# Patient Record
Sex: FEMALE | Race: WHITE | NOT HISPANIC OR LATINO | Employment: OTHER | URBAN - NONMETROPOLITAN AREA
[De-identification: names, ages, dates, MRNs, and addresses within clinical notes are randomized per-mention and may not be internally consistent; named-entity substitution may affect disease eponyms.]

---

## 2023-04-19 NOTE — PROGRESS NOTES
Subjective   Patient ID:   Adriana Marcum is a 72 y.o. female who presents for Establish Care.  HPI  New patient here today to establish care with myself.  Recently moved back to [place] from [place].    COPD:  Taking albuterol and Trelegy.  Breathing is stable.  Needs to see pulmonology.    HTN:  Taking Amlodipine, hydrochlorothiazide, Lisinopril.  BP today is 137/79.  Denies dizziness, headaches.    HLD:  Taking Atorvastatin.  Last checked over a year ago.  DUE for labs.    GERD:  Taking Lansoprazole.    Depression:  Taking Zoloft.  Denies SI/HI.    Health maintenance:  Smoking: Former smoker.  Mammogram (40-75): DUE  Labs: DUE  Colonoscopy (50-75): DUE  DEXA (65+, q 2 years):   Prevnar 13 (65+):  Pneumovax 23 (65+, 1 year after Prev 13):  Influenza:  Zostavax (60+, 1 time, at pharmacy):    Review of Systems  12 point review of systems negative unless stated above in HPI    Vitals:    04/27/23 0919   BP: 137/79   Pulse: 74   Resp: 18   SpO2: 96%       Physical Exam  General: Alert and oriented, well nourished, no acute distress.  Lungs: Clear to auscultation, non-labored respiration.  Heart: Normal rate, regular rhythm, no murmur, gallop or edema.  Neurologic: Awake, alert, and oriented X3, CN II-XII intact.  Psychiatric: Cooperative, appropriate mood and affect.    Assessment/Plan   It was nice meeting you!  I have placed a referral to pulmonology for further management.  I have ordered some labs to be done as soon as you can.  We will call you with the results.  I have ordered you a mammogram to be done as soon as you are able.  We will call the results.  Continue the same medications.  Chronic conditions are stable.  Call with questions or concerns.    F/u  6 months  Diagnoses and all orders for this visit:  Body mass index (BMI) of 36.0-36.9 in adult  Chronic obstructive pulmonary disease, unspecified COPD type (CMS/HCC)  -     albuterol 2.5 mg /3 mL (0.083 %) nebulizer solution; Take 3 mL (2.5 mg) by  nebulization every 4 hours if needed for wheezing.  -     fluticasone-umeclidin-vilanter (TRELEGY-ELLIPTA) 100-62.5-25 mcg blister with device; Inhale 1 puff once daily.  -     Referral to Pulmonology; Future  Hypertension, unspecified type  -     amLODIPine (Norvasc) 5 mg tablet; Take 1 tablet (5 mg) by mouth once daily.  -     hydroCHLOROthiazide (HYDRODiuril) 25 mg tablet; Take 1 tablet (25 mg) by mouth once daily.  -     lisinopril 40 mg tablet; Take 2 tablets (80 mg) by mouth once daily.  Hypercholesterolemia  -     atorvastatin (Lipitor) 80 mg tablet; Take 1 tablet (80 mg) by mouth once daily.  Gastroesophageal reflux disease without esophagitis  -     lansoprazole (Prevacid) 15 mg DR capsule; Take 1 capsule (15 mg) by mouth once daily.  Depression, unspecified depression type  -     sertraline (Zoloft) 100 mg tablet; Take 1 tablet (100 mg) by mouth once daily.  Visit for screening mammogram  -     BI mammo bilateral screening tomosynthesis; Future

## 2023-04-27 ENCOUNTER — OFFICE VISIT (OUTPATIENT)
Dept: PRIMARY CARE | Facility: CLINIC | Age: 73
End: 2023-04-27
Payer: COMMERCIAL

## 2023-04-27 VITALS
WEIGHT: 226.4 LBS | SYSTOLIC BLOOD PRESSURE: 137 MMHG | RESPIRATION RATE: 18 BRPM | HEART RATE: 74 BPM | OXYGEN SATURATION: 96 % | DIASTOLIC BLOOD PRESSURE: 79 MMHG | HEIGHT: 66 IN | BODY MASS INDEX: 36.38 KG/M2

## 2023-04-27 DIAGNOSIS — Z12.31 VISIT FOR SCREENING MAMMOGRAM: ICD-10-CM

## 2023-04-27 DIAGNOSIS — K21.9 GASTROESOPHAGEAL REFLUX DISEASE WITHOUT ESOPHAGITIS: ICD-10-CM

## 2023-04-27 DIAGNOSIS — F32.A DEPRESSION, UNSPECIFIED DEPRESSION TYPE: ICD-10-CM

## 2023-04-27 DIAGNOSIS — I10 HYPERTENSION, UNSPECIFIED TYPE: ICD-10-CM

## 2023-04-27 DIAGNOSIS — J44.9 CHRONIC OBSTRUCTIVE PULMONARY DISEASE, UNSPECIFIED COPD TYPE (MULTI): ICD-10-CM

## 2023-04-27 DIAGNOSIS — E78.00 HYPERCHOLESTEROLEMIA: ICD-10-CM

## 2023-04-27 PROCEDURE — 3075F SYST BP GE 130 - 139MM HG: CPT | Performed by: PHYSICIAN ASSISTANT

## 2023-04-27 PROCEDURE — 1036F TOBACCO NON-USER: CPT | Performed by: PHYSICIAN ASSISTANT

## 2023-04-27 PROCEDURE — 1159F MED LIST DOCD IN RCRD: CPT | Performed by: PHYSICIAN ASSISTANT

## 2023-04-27 PROCEDURE — 99203 OFFICE O/P NEW LOW 30 MIN: CPT | Performed by: PHYSICIAN ASSISTANT

## 2023-04-27 PROCEDURE — 3008F BODY MASS INDEX DOCD: CPT | Performed by: PHYSICIAN ASSISTANT

## 2023-04-27 PROCEDURE — 1160F RVW MEDS BY RX/DR IN RCRD: CPT | Performed by: PHYSICIAN ASSISTANT

## 2023-04-27 PROCEDURE — 3078F DIAST BP <80 MM HG: CPT | Performed by: PHYSICIAN ASSISTANT

## 2023-04-27 RX ORDER — AMLODIPINE BESYLATE 5 MG/1
5 TABLET ORAL DAILY
Qty: 90 TABLET | Refills: 1 | Status: SHIPPED | OUTPATIENT
Start: 2023-04-27 | End: 2023-06-06 | Stop reason: ALTCHOICE

## 2023-04-27 RX ORDER — ALBUTEROL SULFATE 0.83 MG/ML
2.5 SOLUTION RESPIRATORY (INHALATION) EVERY 4 HOURS PRN
COMMUNITY
Start: 2016-11-21 | End: 2023-04-27 | Stop reason: SDUPTHER

## 2023-04-27 RX ORDER — LISINOPRIL 40 MG/1
80 TABLET ORAL DAILY
COMMUNITY
Start: 2023-04-05 | End: 2023-04-27 | Stop reason: SDUPTHER

## 2023-04-27 RX ORDER — CALC/MAG/B COMPLEX/D3/HERB 61
15 TABLET ORAL
COMMUNITY
Start: 2016-12-23 | End: 2023-04-27 | Stop reason: SDUPTHER

## 2023-04-27 RX ORDER — HYDROCHLOROTHIAZIDE 25 MG/1
25 TABLET ORAL
COMMUNITY
Start: 2016-11-08 | End: 2023-04-27 | Stop reason: SDUPTHER

## 2023-04-27 RX ORDER — CALC/MAG/B COMPLEX/D3/HERB 61
15 TABLET ORAL
Qty: 90 CAPSULE | Refills: 1 | Status: SHIPPED | OUTPATIENT
Start: 2023-04-27 | End: 2023-09-26

## 2023-04-27 RX ORDER — SERTRALINE HYDROCHLORIDE 100 MG/1
100 TABLET, FILM COATED ORAL
COMMUNITY
End: 2023-04-27 | Stop reason: SDUPTHER

## 2023-04-27 RX ORDER — HYDROCHLOROTHIAZIDE 25 MG/1
25 TABLET ORAL
Qty: 90 TABLET | Refills: 1 | Status: SHIPPED | OUTPATIENT
Start: 2023-04-27 | End: 2023-06-06 | Stop reason: ALTCHOICE

## 2023-04-27 RX ORDER — AMLODIPINE BESYLATE 5 MG/1
5 TABLET ORAL DAILY
COMMUNITY
Start: 2023-03-18 | End: 2023-04-27 | Stop reason: SDUPTHER

## 2023-04-27 RX ORDER — ASPIRIN 81 MG/1
81 TABLET ORAL
COMMUNITY

## 2023-04-27 RX ORDER — SERTRALINE HYDROCHLORIDE 100 MG/1
100 TABLET, FILM COATED ORAL
Qty: 90 TABLET | Refills: 1 | Status: SHIPPED | OUTPATIENT
Start: 2023-04-27 | End: 2023-11-08

## 2023-04-27 RX ORDER — ALBUTEROL SULFATE 0.83 MG/ML
2.5 SOLUTION RESPIRATORY (INHALATION) EVERY 4 HOURS PRN
Qty: 75 ML | Refills: 2 | Status: SHIPPED | OUTPATIENT
Start: 2023-04-27 | End: 2023-11-02 | Stop reason: ALTCHOICE

## 2023-04-27 RX ORDER — ATORVASTATIN CALCIUM 80 MG/1
80 TABLET, FILM COATED ORAL
COMMUNITY
End: 2023-04-27 | Stop reason: SDUPTHER

## 2023-04-27 RX ORDER — LISINOPRIL 40 MG/1
80 TABLET ORAL DAILY
Qty: 90 TABLET | Refills: 1 | Status: SHIPPED | OUTPATIENT
Start: 2023-04-27 | End: 2023-06-06 | Stop reason: ALTCHOICE

## 2023-04-27 RX ORDER — ATORVASTATIN CALCIUM 80 MG/1
80 TABLET, FILM COATED ORAL
Qty: 90 TABLET | Refills: 1 | Status: SHIPPED | OUTPATIENT
Start: 2023-04-27 | End: 2023-11-08

## 2023-04-27 ASSESSMENT — PATIENT HEALTH QUESTIONNAIRE - PHQ9
2. FEELING DOWN, DEPRESSED OR HOPELESS: SEVERAL DAYS
SUM OF ALL RESPONSES TO PHQ9 QUESTIONS 1 AND 2: 1
1. LITTLE INTEREST OR PLEASURE IN DOING THINGS: NOT AT ALL
10. IF YOU CHECKED OFF ANY PROBLEMS, HOW DIFFICULT HAVE THESE PROBLEMS MADE IT FOR YOU TO DO YOUR WORK, TAKE CARE OF THINGS AT HOME, OR GET ALONG WITH OTHER PEOPLE: SOMEWHAT DIFFICULT

## 2023-04-27 ASSESSMENT — PAIN SCALES - GENERAL: PAINLEVEL: 0-NO PAIN

## 2023-05-30 ENCOUNTER — PATIENT OUTREACH (OUTPATIENT)
Dept: PRIMARY CARE | Facility: CLINIC | Age: 73
End: 2023-05-30

## 2023-05-30 NOTE — PROGRESS NOTES
Discharge Facility: [facility]  Discharge Diagnosis: CABG  Admission Date: 17 May 23  Discharge Date: 28 May 23    PCP Appointment Date: 6 June 23  Specialist Appointment Date: 9 June 23 (cardiology)  Hospital Encounter and Summary: Linked    No contact on outreach. Pt has appt set for follow up

## 2023-06-06 ENCOUNTER — OFFICE VISIT (OUTPATIENT)
Dept: PRIMARY CARE | Facility: CLINIC | Age: 73
End: 2023-06-06
Payer: COMMERCIAL

## 2023-06-06 VITALS
BODY MASS INDEX: 33.65 KG/M2 | OXYGEN SATURATION: 93 % | HEIGHT: 66 IN | HEART RATE: 58 BPM | RESPIRATION RATE: 18 BRPM | SYSTOLIC BLOOD PRESSURE: 131 MMHG | DIASTOLIC BLOOD PRESSURE: 58 MMHG | WEIGHT: 209.4 LBS

## 2023-06-06 DIAGNOSIS — Z09 HOSPITAL DISCHARGE FOLLOW-UP: ICD-10-CM

## 2023-06-06 DIAGNOSIS — K21.9 GASTROESOPHAGEAL REFLUX DISEASE WITHOUT ESOPHAGITIS: ICD-10-CM

## 2023-06-06 DIAGNOSIS — F32.A DEPRESSION, UNSPECIFIED DEPRESSION TYPE: ICD-10-CM

## 2023-06-06 DIAGNOSIS — E78.00 HYPERCHOLESTEROLEMIA: ICD-10-CM

## 2023-06-06 DIAGNOSIS — I10 HYPERTENSION, UNSPECIFIED TYPE: ICD-10-CM

## 2023-06-06 DIAGNOSIS — J44.9 CHRONIC OBSTRUCTIVE PULMONARY DISEASE, UNSPECIFIED COPD TYPE (MULTI): ICD-10-CM

## 2023-06-06 DIAGNOSIS — Z95.1 HISTORY OF CORONARY ARTERY BYPASS GRAFT X 1: ICD-10-CM

## 2023-06-06 PROCEDURE — 3075F SYST BP GE 130 - 139MM HG: CPT | Performed by: INTERNAL MEDICINE

## 2023-06-06 PROCEDURE — 1160F RVW MEDS BY RX/DR IN RCRD: CPT | Performed by: INTERNAL MEDICINE

## 2023-06-06 PROCEDURE — 99496 TRANSJ CARE MGMT HIGH F2F 7D: CPT | Performed by: INTERNAL MEDICINE

## 2023-06-06 PROCEDURE — 1159F MED LIST DOCD IN RCRD: CPT | Performed by: INTERNAL MEDICINE

## 2023-06-06 PROCEDURE — 3078F DIAST BP <80 MM HG: CPT | Performed by: INTERNAL MEDICINE

## 2023-06-06 PROCEDURE — 1036F TOBACCO NON-USER: CPT | Performed by: INTERNAL MEDICINE

## 2023-06-06 PROCEDURE — 3008F BODY MASS INDEX DOCD: CPT | Performed by: INTERNAL MEDICINE

## 2023-06-06 RX ORDER — MULTIVITAMIN
1 TABLET ORAL DAILY
COMMUNITY
Start: 2023-05-27 | End: 2023-11-02 | Stop reason: ALTCHOICE

## 2023-06-06 RX ORDER — TIOTROPIUM BROMIDE 18 UG/1
1 CAPSULE ORAL; RESPIRATORY (INHALATION)
Qty: 30 CAPSULE | Refills: 5 | Status: SHIPPED | OUTPATIENT
Start: 2023-06-06 | End: 2023-11-02 | Stop reason: ALTCHOICE

## 2023-06-06 RX ORDER — ACETAMINOPHEN 325 MG/1
650 TABLET ORAL EVERY 6 HOURS PRN
COMMUNITY
Start: 2023-05-27

## 2023-06-06 RX ORDER — CLOPIDOGREL BISULFATE 75 MG/1
1 TABLET ORAL DAILY
Qty: 29 TABLET | Refills: 0 | COMMUNITY
Start: 2023-05-28 | End: 2023-06-26

## 2023-06-06 RX ORDER — METOPROLOL SUCCINATE 25 MG/1
12.5 TABLET, EXTENDED RELEASE ORAL DAILY
Qty: 90 TABLET | Refills: 0 | Status: SHIPPED | OUTPATIENT
Start: 2023-06-06 | End: 2023-09-07 | Stop reason: SDUPTHER

## 2023-06-06 RX ORDER — IRON POLYSACCHARIDE COMPLEX 150 MG
CAPSULE ORAL
COMMUNITY
Start: 2023-05-28 | End: 2023-06-26

## 2023-06-06 RX ORDER — METOPROLOL TARTRATE 25 MG/1
TABLET, FILM COATED ORAL 2 TIMES DAILY
COMMUNITY
Start: 2023-05-28 | End: 2023-06-06 | Stop reason: ALTCHOICE

## 2023-06-06 RX ORDER — ACETAMINOPHEN 500 MG
TABLET ORAL
COMMUNITY
End: 2023-11-02 | Stop reason: ALTCHOICE

## 2023-06-06 ASSESSMENT — PAIN SCALES - GENERAL: PAINLEVEL: 2

## 2023-06-06 NOTE — PROGRESS NOTES
Patient ID:   Adriana Marcum is a 72 y.o. female with PMH remarkable for COPD, HTN, HLD, GERD, factor V, depression who presents to the office today for Hospital Follow-up.    Hospital Discharge Follow Up:  Date(s): 5/17 to 5/28/2023  Location: [facility] with SOB, hypoxia 2/2 CHF, COPD exacerbation and NSTEMI. Transferred to [facility] for LHC where she was found to have severe ostial LAD disease on cor angio and transferred to [facility] for CABG evaluation. On 5/24/2023 pt underwent left aaron-lateral mini thoracotomy, mid cab procedure - LIMA to LAD, rib re approximation with 0-ticron sutures and closure of subcutaneous/subcuticular tissue.   -- Echo 5/13/2023 showed LVSF 45-50%, mid and apical ant wall, mid and apical anterior septum and apex are abnormal. Pseudonormal pattern of LVDF, ICMO, moderate MVR, mild AVS.     Upcoming appt's:  Dr Melton 6/9  EKG and CxR 6/19/2023  Cardiac Surgery Dr Mueller 6/20    - pt was seen in the office by my colleague on 4/27/2023 to establish care within the practice.  -- will price out spiriva since trelegy is very expensive.     REVIEW OF SYSTEMS:  Review of Systems   All other systems reviewed and are negative.    12 point review of systems negative unless stated above in HPI    VITAL SIGNS:  Vitals:    06/06/23 1340   BP: 131/58   Pulse: 58   Resp: 18   SpO2: 93%       ALLERGIES:  Allergies   Allergen Reactions    Oxycodone-Acetaminophen Other     tachycardia    Celebrex [Celecoxib] Other     Stomach upset        PHYSICAL EXAM:  Physical Exam  Vitals reviewed.   Constitutional:       General: She is not in acute distress.     Appearance: Normal appearance. She is not ill-appearing.   HENT:      Head: Normocephalic and atraumatic.      Right Ear: Tympanic membrane and external ear normal.      Left Ear: Tympanic membrane and external ear normal.      Nose: Nose normal.      Mouth/Throat:      Mouth: Mucous membranes are moist.      Pharynx: Oropharynx is clear.   Eyes:       Conjunctiva/sclera: Conjunctivae normal.      Pupils: Pupils are equal, round, and reactive to light.   Cardiovascular:      Rate and Rhythm: Normal rate and regular rhythm.      Heart sounds: Normal heart sounds. No murmur heard.  Pulmonary:      Effort: Pulmonary effort is normal. No respiratory distress.      Breath sounds: Normal breath sounds. No wheezing.   Abdominal:      General: There is no distension.      Palpations: Abdomen is soft. There is no mass.      Tenderness: There is no abdominal tenderness.   Musculoskeletal:         General: Normal range of motion.      Cervical back: Normal range of motion and neck supple.   Skin:     General: Skin is warm and dry.   Neurological:      General: No focal deficit present.      Mental Status: She is alert and oriented to person, place, and time.      Sensory: No sensory deficit.      Motor: No weakness.      Coordination: Coordination normal.      Gait: Gait normal.   Psychiatric:         Mood and Affect: Mood normal.         Behavior: Behavior normal.         MEDICATIONS:  Current Outpatient Medications on File Prior to Visit   Medication Sig Dispense Refill    acetaminophen (Tylenol) 325 mg tablet Take 2 tablets (650 mg) by mouth every 6 hours if needed.      albuterol 2.5 mg /3 mL (0.083 %) nebulizer solution Take 3 mL (2.5 mg) by nebulization every 4 hours if needed for wheezing. 75 mL 2    aspirin 81 mg EC tablet Take 1 tablet (81 mg) by mouth once daily.      atorvastatin (Lipitor) 80 mg tablet Take 1 tablet (80 mg) by mouth once daily. 90 tablet 1    clopidogrel (Plavix) 75 mg tablet Take 1 tablet (75 mg) by mouth once daily. 29 tablet 0    fluticasone-umeclidin-vilanter (TRELEGY-ELLIPTA) 100-62.5-25 mcg blister with device Inhale 1 puff once daily. 3 each 1    iron polysaccharides (Nu-Iron,Niferex) 150 mg iron capsule Take by mouth.      lansoprazole (Prevacid) 15 mg DR capsule Take 1 capsule (15 mg) by mouth once daily. 90 capsule 1    melatonin 5 mg  tablet Take by mouth.      multivitamin tablet Take 1 tablet by mouth once daily.      sertraline (Zoloft) 100 mg tablet Take 1 tablet (100 mg) by mouth once daily. 90 tablet 1    [DISCONTINUED] metoprolol tartrate (Lopressor) 25 mg tablet Take by mouth twice a day. TAKE ONE TABLET TWICE DAILY; TO HOLD IF HEART RATE 60 OR LESS      [DISCONTINUED] amLODIPine (Norvasc) 5 mg tablet Take 1 tablet (5 mg) by mouth once daily. 90 tablet 1    [DISCONTINUED] hydroCHLOROthiazide (HYDRODiuril) 25 mg tablet Take 1 tablet (25 mg) by mouth once daily. 90 tablet 1    [DISCONTINUED] lisinopril 40 mg tablet Take 2 tablets (80 mg) by mouth once daily. 90 tablet 1     No current facility-administered medications on file prior to visit.       LABORATORY DATA:  Lab Results   Component Value Date    WBC 11.8 (H) 05/28/2023    HGB 10.8 (L) 05/28/2023    HCT 32.7 (L) 05/28/2023     05/28/2023    CHOL 168 05/13/2023    TRIG 95 05/13/2023    HDL 59.1 05/13/2023    ALT 26 05/18/2023    AST 17 05/18/2023     (L) 05/28/2023    K 4.0 05/28/2023    CL 94 (L) 05/28/2023    CREATININE 0.84 05/28/2023    BUN 18 05/28/2023    CO2 25 05/28/2023    TSH 4.07 (H) 05/18/2023    INR 1.1 05/24/2023    HGBA1C CANCELED 05/18/2023     ASSESSMENT AND PLAN:  Assessment/Plan   Diagnoses and all orders for this visit:  Hospital discharge follow-up  Chronic obstructive pulmonary disease, unspecified COPD type (CMS/[facility])  -     tiotropium (Spiriva) 18 mcg inhalation capsule; Place 1 capsule (18 mcg) into inhaler and inhale once daily.  Hypertension, unspecified type  Depression, unspecified depression type  Gastroesophageal reflux disease without esophagitis  Hypercholesterolemia  History of coronary artery bypass graft x 1  -     metoprolol succinate XL (Toprol-XL) 25 mg 24 hr tablet; Take 0.5 tablets (12.5 mg) by mouth once daily. Do not crush or chew. Hold if dizzy, lightheaded      ------  Written by Zonia Ortiz RN, acting as a scribe for   Cesar. This note accurately reflects the work and decisions made by Dr. Guerrero.     I, Dr. Guerrero, attest all medical record entries made by the scribe were under my direction and were personally dictated by me. I have reviewed the chart and agree that the record accurately reflects my performance of the history, physical exam, and assessment and plan.

## 2023-06-07 ENCOUNTER — PATIENT OUTREACH (OUTPATIENT)
Dept: PRIMARY CARE | Facility: CLINIC | Age: 73
End: 2023-06-07

## 2023-06-07 NOTE — PROGRESS NOTES
Call regarding appt. with PCP on (6 June 23) after hospitalization.  At time of outreach call the patient feels as if their condition has improved since last visit.  Reviewed the PCP appointment with the pt and addressed any questions or concerns.

## 2023-06-14 DIAGNOSIS — J44.9 CHRONIC OBSTRUCTIVE PULMONARY DISEASE, UNSPECIFIED COPD TYPE (MULTI): Primary | ICD-10-CM

## 2023-06-14 LAB
ANION GAP IN SER/PLAS: 15 MMOL/L (ref 10–20)
BASOPHILS (10*3/UL) IN BLOOD BY AUTOMATED COUNT: 0.05 X10E9/L (ref 0–0.1)
BASOPHILS/100 LEUKOCYTES IN BLOOD BY AUTOMATED COUNT: 0.7 % (ref 0–2)
CALCIUM (MG/DL) IN SER/PLAS: 9.2 MG/DL (ref 8.6–10.3)
CARBON DIOXIDE, TOTAL (MMOL/L) IN SER/PLAS: 25 MMOL/L (ref 21–32)
CHLORIDE (MMOL/L) IN SER/PLAS: 100 MMOL/L (ref 98–107)
CREATININE (MG/DL) IN SER/PLAS: 0.68 MG/DL (ref 0.5–1.05)
EOSINOPHILS (10*3/UL) IN BLOOD BY AUTOMATED COUNT: 0.29 X10E9/L (ref 0–0.4)
EOSINOPHILS/100 LEUKOCYTES IN BLOOD BY AUTOMATED COUNT: 4.2 % (ref 0–6)
ERYTHROCYTE DISTRIBUTION WIDTH (RATIO) BY AUTOMATED COUNT: 13.6 % (ref 11.5–14.5)
ERYTHROCYTE MEAN CORPUSCULAR HEMOGLOBIN CONCENTRATION (G/DL) BY AUTOMATED: 32.4 G/DL (ref 32–36)
ERYTHROCYTE MEAN CORPUSCULAR VOLUME (FL) BY AUTOMATED COUNT: 92 FL (ref 80–100)
ERYTHROCYTES (10*6/UL) IN BLOOD BY AUTOMATED COUNT: 3.32 X10E12/L (ref 4–5.2)
GFR FEMALE: >90 ML/MIN/1.73M2
GLUCOSE (MG/DL) IN SER/PLAS: 98 MG/DL (ref 74–99)
HEMATOCRIT (%) IN BLOOD BY AUTOMATED COUNT: 30.6 % (ref 36–46)
HEMOGLOBIN (G/DL) IN BLOOD: 9.9 G/DL (ref 12–16)
IMMATURE GRANULOCYTES/100 LEUKOCYTES IN BLOOD BY AUTOMATED COUNT: 0.4 % (ref 0–0.9)
LEUKOCYTES (10*3/UL) IN BLOOD BY AUTOMATED COUNT: 7 X10E9/L (ref 4.4–11.3)
LYMPHOCYTES (10*3/UL) IN BLOOD BY AUTOMATED COUNT: 1.45 X10E9/L (ref 0.8–3)
LYMPHOCYTES/100 LEUKOCYTES IN BLOOD BY AUTOMATED COUNT: 20.8 % (ref 13–44)
MAGNESIUM (MG/DL) IN SER/PLAS: 1.18 MG/DL (ref 1.6–2.4)
MONOCYTES (10*3/UL) IN BLOOD BY AUTOMATED COUNT: 0.5 X10E9/L (ref 0.05–0.8)
MONOCYTES/100 LEUKOCYTES IN BLOOD BY AUTOMATED COUNT: 7.2 % (ref 2–10)
NEUTROPHILS (10*3/UL) IN BLOOD BY AUTOMATED COUNT: 4.64 X10E9/L (ref 1.6–5.5)
NEUTROPHILS/100 LEUKOCYTES IN BLOOD BY AUTOMATED COUNT: 66.7 % (ref 40–80)
PLATELETS (10*3/UL) IN BLOOD AUTOMATED COUNT: 248 X10E9/L (ref 150–450)
POTASSIUM (MMOL/L) IN SER/PLAS: 4 MMOL/L (ref 3.5–5.3)
SODIUM (MMOL/L) IN SER/PLAS: 136 MMOL/L (ref 136–145)
UREA NITROGEN (MG/DL) IN SER/PLAS: 14 MG/DL (ref 6–23)

## 2023-06-29 ENCOUNTER — PATIENT OUTREACH (OUTPATIENT)
Dept: PRIMARY CARE | Facility: CLINIC | Age: 73
End: 2023-06-29

## 2023-06-29 NOTE — PROGRESS NOTES
Call placed regarding one month post discharge follow up call.  At time of outreach call the patient feels as if their condition has improved since initial visit with PCP or specialist.  Questions or concerns regarding recovery period addressed at this time.   Reviewed any PCP or specialists progress notes/labs/radiology reports if applicable and addressed any questions or concerns.

## 2023-08-25 ENCOUNTER — PATIENT OUTREACH (OUTPATIENT)
Dept: PRIMARY CARE | Facility: CLINIC | Age: 73
End: 2023-08-25

## 2023-08-25 NOTE — PROGRESS NOTES
No contact on 90 day outreach. Message left for patient. Patient has graduated from TCM program at this time.

## 2023-09-07 DIAGNOSIS — Z95.1 HISTORY OF CORONARY ARTERY BYPASS GRAFT X 1: ICD-10-CM

## 2023-09-07 RX ORDER — METOPROLOL SUCCINATE 25 MG/1
12.5 TABLET, EXTENDED RELEASE ORAL DAILY
Qty: 90 TABLET | Refills: 0 | Status: SHIPPED | OUTPATIENT
Start: 2023-09-07 | End: 2024-02-12

## 2023-09-26 DIAGNOSIS — K21.9 GASTROESOPHAGEAL REFLUX DISEASE WITHOUT ESOPHAGITIS: ICD-10-CM

## 2023-09-26 RX ORDER — CALC/MAG/B COMPLEX/D3/HERB 61
15 TABLET ORAL DAILY
Qty: 90 CAPSULE | Refills: 1 | Status: SHIPPED | OUTPATIENT
Start: 2023-09-26 | End: 2024-05-02

## 2023-10-26 ENCOUNTER — APPOINTMENT (OUTPATIENT)
Dept: PRIMARY CARE | Facility: CLINIC | Age: 73
End: 2023-10-26
Payer: MEDICARE

## 2023-11-02 ENCOUNTER — OFFICE VISIT (OUTPATIENT)
Dept: PRIMARY CARE | Facility: CLINIC | Age: 73
End: 2023-11-02
Payer: MEDICARE

## 2023-11-02 VITALS
HEIGHT: 66 IN | HEART RATE: 65 BPM | OXYGEN SATURATION: 96 % | SYSTOLIC BLOOD PRESSURE: 151 MMHG | DIASTOLIC BLOOD PRESSURE: 83 MMHG | WEIGHT: 219.2 LBS | BODY MASS INDEX: 35.23 KG/M2 | RESPIRATION RATE: 18 BRPM

## 2023-11-02 DIAGNOSIS — E78.00 HYPERCHOLESTEROLEMIA: ICD-10-CM

## 2023-11-02 DIAGNOSIS — I25.810 CORONARY ARTERY DISEASE INVOLVING CORONARY BYPASS GRAFT OF NATIVE HEART WITHOUT ANGINA PECTORIS: ICD-10-CM

## 2023-11-02 DIAGNOSIS — Z12.11 COLON CANCER SCREENING: ICD-10-CM

## 2023-11-02 DIAGNOSIS — R06.00 DYSPNEA, UNSPECIFIED TYPE: ICD-10-CM

## 2023-11-02 DIAGNOSIS — Z78.9: ICD-10-CM

## 2023-11-02 DIAGNOSIS — K21.9 GASTROESOPHAGEAL REFLUX DISEASE WITHOUT ESOPHAGITIS: ICD-10-CM

## 2023-11-02 DIAGNOSIS — F32.A DEPRESSION, UNSPECIFIED DEPRESSION TYPE: ICD-10-CM

## 2023-11-02 DIAGNOSIS — I10 HYPERTENSION, UNSPECIFIED TYPE: ICD-10-CM

## 2023-11-02 DIAGNOSIS — J44.9 CHRONIC OBSTRUCTIVE PULMONARY DISEASE, UNSPECIFIED COPD TYPE (MULTI): ICD-10-CM

## 2023-11-02 PROBLEM — R79.89 ELEVATED TROPONIN LEVEL: Status: ACTIVE | Noted: 2023-11-02

## 2023-11-02 PROBLEM — R09.89 BRUIT: Status: ACTIVE | Noted: 2023-05-18

## 2023-11-02 PROBLEM — R09.02 HYPOXIA: Status: ACTIVE | Noted: 2023-11-02

## 2023-11-02 PROBLEM — J90 PLEURAL EFFUSION: Status: ACTIVE | Noted: 2023-11-02

## 2023-11-02 PROBLEM — R06.02 SHORT OF BREATH ON EXERTION: Status: ACTIVE | Noted: 2023-11-02

## 2023-11-02 PROBLEM — G89.18 ACUTE POSTOPERATIVE PAIN: Status: RESOLVED | Noted: 2023-11-02 | Resolved: 2023-11-02

## 2023-11-02 PROBLEM — E87.1 HYPONATREMIA: Status: ACTIVE | Noted: 2023-11-02

## 2023-11-02 PROBLEM — Z86.2 HISTORY OF FACTOR V LEIDEN MUTATION: Status: ACTIVE | Noted: 2023-11-02

## 2023-11-02 PROBLEM — I21.4 NSTEMI, INITIAL EPISODE OF CARE (MULTI): Status: ACTIVE | Noted: 2023-11-02

## 2023-11-02 PROBLEM — I50.20 HFREF (HEART FAILURE WITH REDUCED EJECTION FRACTION) (MULTI): Status: ACTIVE | Noted: 2023-11-02

## 2023-11-02 PROBLEM — R06.02 SHORTNESS OF BREATH AT REST: Status: ACTIVE | Noted: 2023-11-02

## 2023-11-02 PROBLEM — I50.21 ACUTE SYSTOLIC HEART FAILURE (MULTI): Status: ACTIVE | Noted: 2023-11-02

## 2023-11-02 PROCEDURE — 99213 OFFICE O/P EST LOW 20 MIN: CPT | Performed by: INTERNAL MEDICINE

## 2023-11-02 PROCEDURE — 3079F DIAST BP 80-89 MM HG: CPT | Performed by: INTERNAL MEDICINE

## 2023-11-02 PROCEDURE — 1160F RVW MEDS BY RX/DR IN RCRD: CPT | Performed by: INTERNAL MEDICINE

## 2023-11-02 PROCEDURE — 1159F MED LIST DOCD IN RCRD: CPT | Performed by: INTERNAL MEDICINE

## 2023-11-02 PROCEDURE — 1125F AMNT PAIN NOTED PAIN PRSNT: CPT | Performed by: INTERNAL MEDICINE

## 2023-11-02 PROCEDURE — 3077F SYST BP >= 140 MM HG: CPT | Performed by: INTERNAL MEDICINE

## 2023-11-02 PROCEDURE — 1036F TOBACCO NON-USER: CPT | Performed by: INTERNAL MEDICINE

## 2023-11-02 RX ORDER — CLOPIDOGREL BISULFATE 75 MG/1
75 TABLET ORAL DAILY
COMMUNITY
Start: 2023-09-03

## 2023-11-02 RX ORDER — LISINOPRIL AND HYDROCHLOROTHIAZIDE 12.5; 2 MG/1; MG/1
1 TABLET ORAL DAILY
COMMUNITY
Start: 2023-09-22 | End: 2023-11-06 | Stop reason: DRUGHIGH

## 2023-11-02 ASSESSMENT — ENCOUNTER SYMPTOMS
SHORTNESS OF BREATH: 1
PSYCHIATRIC NEGATIVE: 1
GASTROINTESTINAL NEGATIVE: 1
MUSCULOSKELETAL NEGATIVE: 1
CONSTITUTIONAL NEGATIVE: 1
CARDIOVASCULAR NEGATIVE: 1
NEUROLOGICAL NEGATIVE: 1

## 2023-11-02 ASSESSMENT — PAIN SCALES - GENERAL: PAINLEVEL: 2

## 2023-11-02 NOTE — PROGRESS NOTES
Patient ID: She states that she has been having increased SOB when going to [facility] with her  who has had stents placed. She states that people are so concerned about her SOB that they have her sit in wheelchair. She states she is anxious about her  at those times, but per her , she is unable to walk long distances as she gets winded. She admits she gets SOB that She states that she is using Trelegy as ordered. She denies any swelling in her legs. She states that she had 2D echo in October and then saw Dr. Melton afterward and she states that he told her it looked ok.     HPI Adriana Marcum is a 73 y.o. female with PMH remarkable for  COPD, HTN, HLD, GERD, factor V, depression  who presents to the office today for six month follow up.    HEALTH MAINTENANCE: FOLLOW UP  Smoking: former smoker, quit in   Mammogram (40-75): DUE  Pap smear (21-65): n/a due to age  Labs: 23  Colonoscopy (45-75): DUE  Lung cancer screening (55-80 + 30 pack year + smoking/quit in last 15 years):   DEXA (65+, q 2 years): DUE    SOCIAL HISTORY:  Social History     Tobacco Use    Smoking status: Former     Types: Cigarettes     Quit date:      Years since quittin.8    Smokeless tobacco: Never   Substance Use Topics    Alcohol use: Yes     Comment: ocassional    Drug use: Never     IMMUNIZATIONS:  Immunization History   Administered Date(s) Administered    Pfizer Purple Cap SARS-CoV-2 2021, 03/10/2021     REVIEW OF SYSTEMS:  Review of Systems   Constitutional: Negative.    Respiratory:  Positive for shortness of breath.    Cardiovascular: Negative.    Gastrointestinal: Negative.    Genitourinary: Negative.    Musculoskeletal: Negative.    Neurological: Negative.    Psychiatric/Behavioral: Negative.       ALLERGIES:  Allergies   Allergen Reactions    Oxycodone-Acetaminophen Other     tachycardia    Celebrex [Celecoxib] Other     Stomach upset      VITAL SIGNS:  Vitals:    23 1002   BP: 151/83  "  Pulse: 65   Resp: 18   SpO2: 96%   Weight: 99.4 kg (219 lb 3.2 oz)   Height: 1.676 m (5' 6\")     PHYSICAL EXAM:  Physical Exam  Vitals reviewed.   Constitutional:       Appearance: Normal appearance.   HENT:      Head: Normocephalic and atraumatic.   Cardiovascular:      Rate and Rhythm: Normal rate and regular rhythm.      Pulses: Normal pulses.      Heart sounds: Normal heart sounds.   Pulmonary:      Effort: Pulmonary effort is normal.      Breath sounds: Normal breath sounds.   Abdominal:      General: Bowel sounds are normal.      Palpations: Abdomen is soft.   Musculoskeletal:         General: Normal range of motion.   Neurological:      General: No focal deficit present.      Mental Status: She is alert and oriented to person, place, and time.   Psychiatric:         Mood and Affect: Mood normal.         Behavior: Behavior normal.       MEDICATIONS:  Current Outpatient Medications on File Prior to Visit   Medication Sig Dispense Refill    acetaminophen (Tylenol) 325 mg tablet Take 2 tablets (650 mg) by mouth every 6 hours if needed.      albuterol 2.5 mg /3 mL (0.083 %) nebulizer solution Take 3 mL (2.5 mg) by nebulization every 4 hours if needed for wheezing. 75 mL 2    aspirin 81 mg EC tablet Take 1 tablet (81 mg) by mouth once daily.      atorvastatin (Lipitor) 80 mg tablet Take 1 tablet (80 mg) by mouth once daily. 90 tablet 1    fluticasone-umeclidin-vilanter (TRELEGY-ELLIPTA) 100-62.5-25 mcg blister with device Inhale 1 puff once daily. 3 each 1    lansoprazole (Prevacid) 15 mg DR capsule TAKE 1 CAPSULE ONE TIME DAILY 90 capsule 1    melatonin 5 mg tablet Take by mouth.      metoprolol succinate XL (Toprol-XL) 25 mg 24 hr tablet Take 0.5 tablets (12.5 mg) by mouth once daily. Do not crush or chew. Hold if dizzy, lightheaded 90 tablet 0    multivitamin tablet Take 1 tablet by mouth once daily.      sertraline (Zoloft) 100 mg tablet Take 1 tablet (100 mg) by mouth once daily. 90 tablet 1    tiotropium " (Spiriva) 18 mcg inhalation capsule Place 1 capsule (18 mcg) into inhaler and inhale once daily. 30 capsule 5     No current facility-administered medications on file prior to visit.      LABORATORY DATA:  Lab Results   Component Value Date    WBC 7.0 06/14/2023    HGB 9.9 (L) 06/14/2023    HCT 30.6 (L) 06/14/2023     06/14/2023    CHOL 168 05/13/2023    TRIG 95 05/13/2023    HDL 59.1 05/13/2023    ALT 26 05/18/2023    AST 17 05/18/2023     06/14/2023    K 4.0 06/14/2023     06/14/2023    CREATININE 0.68 06/14/2023    BUN 14 06/14/2023    CO2 25 06/14/2023    TSH 4.07 (H) 05/18/2023    INR 1.1 05/24/2023    HGBA1C CANCELED 05/18/2023     ASSESSMENT AND PLAN:  Assessment/Plan   Healthcare Maintenance: follow up  - reviewed most recent bloodwork from May and June  - she is due for mammogram, but wishes to wait until after first of year as she is still sore from her recent CABG in May  - she is due for colonoscopy, order inputted  - Follow up in four months    Severe CROW, unclear etiology  - s/p recent CABG, history of COPD,   - no evidence of any volume overload or COPD exacerbation  - will get Chest xray  - reviewed recent post-surgical stress test and 2D echo, no evidence of pericarditis, normal 2D echo with improved EF and normal stress test  - will check ESR, CRP, BNP  - check CBC to make sure patient is not anemic  - if negative workup, patient may need to have cardiac rehab to overcome deconditioning    CAD/HTN  - [facility] during hospitalization in May which revealed severe ostial LAD disease on cor angio and transferred to [facility] for CABG evaluation. On 5/24/2023 pt underwent left aaron-lateral mini thoracotomy, mid cab procedure - LIMA to LAD, rib re approximation with 0-ticron sutures and closure of subcutaneous/subcuticular tissue.   - 2Echo on 5/13/2023 showed LVSF 45-50%, mid and apical ant wall, mid and apical anterior septum and apex are abnormal. Pseudonormal pattern of LVDF, ICMO,  moderate MVR, mild AVS.   - she underwent CABG X 1 in May 2023  - reviewed most recent OV notes and stress test/2D echo from Dr. Melton on 09/21/23  - continue with Lisinopril-hydrochlorothiazide, Metoprolol, Plavix, ASA and lipid lowering agent    COPD  - see above  - continue with Trelegy inhaler    Depression  - stable   - continue with Sertraline    GERD  - stable  - continue with PPI    HLD  - continue with statin  - continue monitoring lipid panel    --------------------  Written by Lucero Tamayo LPN, acting as a scribe for Dr. Guerrero. This note accurately reflects the work and decisions made by Dr. Guerrero.     I, Dr. Guerrero, attest all medical record entries made by the scribe were under my direction and were personally dictated by me. I have reviewed the chart and agree that the record accurately reflects my performance of the history, physical exam, and assessment and plan.

## 2023-11-03 ENCOUNTER — LAB (OUTPATIENT)
Dept: LAB | Facility: LAB | Age: 73
End: 2023-11-03
Payer: MEDICARE

## 2023-11-03 DIAGNOSIS — R73.9 HYPERGLYCEMIA: ICD-10-CM

## 2023-11-03 DIAGNOSIS — R06.00 DYSPNEA, UNSPECIFIED TYPE: ICD-10-CM

## 2023-11-03 LAB
ANION GAP SERPL CALC-SCNC: 16 MMOL/L (ref 10–20)
BASOPHILS # BLD AUTO: 0.04 X10*3/UL (ref 0–0.1)
BASOPHILS NFR BLD AUTO: 0.5 %
BNP SERPL-MCNC: 116 PG/ML (ref 0–99)
BUN SERPL-MCNC: 21 MG/DL (ref 6–23)
CALCIUM SERPL-MCNC: 9.7 MG/DL (ref 8.6–10.3)
CHLORIDE SERPL-SCNC: 98 MMOL/L (ref 98–107)
CO2 SERPL-SCNC: 25 MMOL/L (ref 21–32)
CREAT SERPL-MCNC: 0.73 MG/DL (ref 0.5–1.05)
CRP SERPL-MCNC: 0.36 MG/DL
EOSINOPHIL # BLD AUTO: 0.1 X10*3/UL (ref 0–0.4)
EOSINOPHIL NFR BLD AUTO: 1.3 %
ERYTHROCYTE [DISTWIDTH] IN BLOOD BY AUTOMATED COUNT: 15 % (ref 11.5–14.5)
ERYTHROCYTE [SEDIMENTATION RATE] IN BLOOD BY WESTERGREN METHOD: 26 MM/H (ref 0–30)
GFR SERPL CREATININE-BSD FRML MDRD: 86 ML/MIN/1.73M*2
GLUCOSE SERPL-MCNC: 174 MG/DL (ref 74–99)
HCT VFR BLD AUTO: 34.5 % (ref 36–46)
HGB BLD-MCNC: 11.3 G/DL (ref 12–16)
IMM GRANULOCYTES # BLD AUTO: 0.1 X10*3/UL (ref 0–0.5)
IMM GRANULOCYTES NFR BLD AUTO: 1.3 % (ref 0–0.9)
LYMPHOCYTES # BLD AUTO: 1.99 X10*3/UL (ref 0.8–3)
LYMPHOCYTES NFR BLD AUTO: 25.3 %
MCH RBC QN AUTO: 30 PG (ref 26–34)
MCHC RBC AUTO-ENTMCNC: 32.8 G/DL (ref 32–36)
MCV RBC AUTO: 92 FL (ref 80–100)
MONOCYTES # BLD AUTO: 0.46 X10*3/UL (ref 0.05–0.8)
MONOCYTES NFR BLD AUTO: 5.9 %
NEUTROPHILS # BLD AUTO: 5.17 X10*3/UL (ref 1.6–5.5)
NEUTROPHILS NFR BLD AUTO: 65.7 %
NRBC BLD-RTO: 0 /100 WBCS (ref 0–0)
PLATELET # BLD AUTO: 217 X10*3/UL (ref 150–450)
POTASSIUM SERPL-SCNC: 4 MMOL/L (ref 3.5–5.3)
RBC # BLD AUTO: 3.77 X10*6/UL (ref 4–5.2)
SODIUM SERPL-SCNC: 135 MMOL/L (ref 136–145)
WBC # BLD AUTO: 7.9 X10*3/UL (ref 4.4–11.3)

## 2023-11-03 PROCEDURE — 36415 COLL VENOUS BLD VENIPUNCTURE: CPT

## 2023-11-03 PROCEDURE — 83036 HEMOGLOBIN GLYCOSYLATED A1C: CPT

## 2023-11-06 DIAGNOSIS — I10 HYPERTENSION, UNSPECIFIED TYPE: ICD-10-CM

## 2023-11-06 DIAGNOSIS — R73.9 HYPERGLYCEMIA: ICD-10-CM

## 2023-11-06 LAB
EST. AVERAGE GLUCOSE BLD GHB EST-MCNC: 126 MG/DL
HBA1C MFR BLD: 6 %

## 2023-11-07 ENCOUNTER — ANCILLARY PROCEDURE (OUTPATIENT)
Dept: RADIOLOGY | Facility: CLINIC | Age: 73
End: 2023-11-07
Payer: MEDICARE

## 2023-11-07 DIAGNOSIS — R06.00 DYSPNEA, UNSPECIFIED TYPE: ICD-10-CM

## 2023-11-07 PROCEDURE — 71046 X-RAY EXAM CHEST 2 VIEWS: CPT | Performed by: RADIOLOGY

## 2023-11-07 PROCEDURE — 71046 X-RAY EXAM CHEST 2 VIEWS: CPT | Mod: FY

## 2023-11-08 DIAGNOSIS — E78.00 HYPERCHOLESTEROLEMIA: ICD-10-CM

## 2023-11-08 DIAGNOSIS — F32.A DEPRESSION, UNSPECIFIED DEPRESSION TYPE: ICD-10-CM

## 2023-11-08 RX ORDER — SERTRALINE HYDROCHLORIDE 100 MG/1
100 TABLET, FILM COATED ORAL DAILY
Qty: 90 TABLET | Refills: 10 | Status: SHIPPED | OUTPATIENT
Start: 2023-11-08

## 2023-11-08 RX ORDER — ATORVASTATIN CALCIUM 80 MG/1
80 TABLET, FILM COATED ORAL DAILY
Qty: 90 TABLET | Refills: 10 | Status: SHIPPED | OUTPATIENT
Start: 2023-11-08

## 2023-11-09 DIAGNOSIS — I10 HYPERTENSION, UNSPECIFIED TYPE: ICD-10-CM

## 2023-11-09 RX ORDER — LISINOPRIL AND HYDROCHLOROTHIAZIDE 20; 25 MG/1; MG/1
1 TABLET ORAL DAILY
COMMUNITY
End: 2023-11-09 | Stop reason: SDUPTHER

## 2023-11-09 RX ORDER — LISINOPRIL AND HYDROCHLOROTHIAZIDE 20; 25 MG/1; MG/1
1 TABLET ORAL DAILY
Qty: 90 TABLET | Refills: 0 | Status: SHIPPED | OUTPATIENT
Start: 2023-11-09 | End: 2023-11-30

## 2023-11-10 DIAGNOSIS — J81.0 ACUTE PULMONARY EDEMA (MULTI): ICD-10-CM

## 2023-11-10 RX ORDER — LISINOPRIL AND HYDROCHLOROTHIAZIDE 20; 25 MG/1; MG/1
1 TABLET ORAL DAILY
Qty: 90 TABLET | Refills: 0 | Status: SHIPPED | OUTPATIENT
Start: 2023-11-10 | End: 2024-02-05 | Stop reason: ALTCHOICE

## 2023-11-10 RX ORDER — FUROSEMIDE 20 MG/1
20 TABLET ORAL 2 TIMES WEEKLY
Qty: 8 TABLET | Refills: 2 | Status: SHIPPED | OUTPATIENT
Start: 2023-11-13 | End: 2024-01-05

## 2023-11-30 DIAGNOSIS — I10 HYPERTENSION, UNSPECIFIED TYPE: ICD-10-CM

## 2023-11-30 RX ORDER — LISINOPRIL AND HYDROCHLOROTHIAZIDE 20; 25 MG/1; MG/1
1 TABLET ORAL DAILY
Qty: 90 TABLET | Refills: 3 | Status: SHIPPED | OUTPATIENT
Start: 2023-11-30

## 2023-12-13 ENCOUNTER — TELEPHONE (OUTPATIENT)
Dept: PRIMARY CARE | Facility: CLINIC | Age: 73
End: 2023-12-13
Payer: MEDICARE

## 2023-12-13 DIAGNOSIS — R21 RASH: Primary | ICD-10-CM

## 2023-12-13 DIAGNOSIS — B37.9 YEAST INFECTION: ICD-10-CM

## 2023-12-14 RX ORDER — FLUCONAZOLE 200 MG/1
200 TABLET ORAL DAILY
Qty: 1 TABLET | Refills: 0 | Status: SHIPPED | OUTPATIENT
Start: 2023-12-14 | End: 2024-02-05 | Stop reason: ALTCHOICE

## 2023-12-14 RX ORDER — NYSTATIN 100000 U/G
CREAM TOPICAL 2 TIMES DAILY
Qty: 30 G | Refills: 0 | Status: SHIPPED | OUTPATIENT
Start: 2023-12-14 | End: 2023-12-21

## 2024-01-05 DIAGNOSIS — J81.0 ACUTE PULMONARY EDEMA (MULTI): ICD-10-CM

## 2024-01-05 DIAGNOSIS — J44.9 CHRONIC OBSTRUCTIVE PULMONARY DISEASE, UNSPECIFIED COPD TYPE (MULTI): ICD-10-CM

## 2024-01-05 RX ORDER — FUROSEMIDE 20 MG/1
20 TABLET ORAL DAILY
Qty: 90 TABLET | Refills: 0 | Status: SHIPPED | OUTPATIENT
Start: 2024-01-05 | End: 2024-01-29

## 2024-01-29 DIAGNOSIS — J81.0 ACUTE PULMONARY EDEMA (MULTI): ICD-10-CM

## 2024-01-29 RX ORDER — FUROSEMIDE 20 MG/1
20 TABLET ORAL 2 TIMES WEEKLY
Qty: 8 TABLET | Refills: 2 | Status: SHIPPED | OUTPATIENT
Start: 2024-01-29 | End: 2024-03-18

## 2024-02-05 ENCOUNTER — OFFICE VISIT (OUTPATIENT)
Dept: PRIMARY CARE | Facility: CLINIC | Age: 74
End: 2024-02-05
Payer: MEDICARE

## 2024-02-05 VITALS
HEIGHT: 66 IN | SYSTOLIC BLOOD PRESSURE: 159 MMHG | RESPIRATION RATE: 18 BRPM | HEART RATE: 66 BPM | OXYGEN SATURATION: 94 % | WEIGHT: 224.4 LBS | DIASTOLIC BLOOD PRESSURE: 70 MMHG | BODY MASS INDEX: 36.07 KG/M2

## 2024-02-05 DIAGNOSIS — I50.33 ACUTE ON CHRONIC DIASTOLIC (CONGESTIVE) HEART FAILURE (MULTI): ICD-10-CM

## 2024-02-05 DIAGNOSIS — F32.A DEPRESSION, UNSPECIFIED DEPRESSION TYPE: ICD-10-CM

## 2024-02-05 DIAGNOSIS — I10 HYPERTENSION, UNSPECIFIED TYPE: Primary | ICD-10-CM

## 2024-02-05 DIAGNOSIS — E66.01 OBESITY, MORBID (MULTI): ICD-10-CM

## 2024-02-05 DIAGNOSIS — J44.9 CHRONIC OBSTRUCTIVE PULMONARY DISEASE, UNSPECIFIED COPD TYPE (MULTI): ICD-10-CM

## 2024-02-05 PROBLEM — J96.90 RESPIRATORY FAILURE (MULTI): Status: RESOLVED | Noted: 2023-05-12 | Resolved: 2024-02-05

## 2024-02-05 PROBLEM — M25.559 HIP PAIN: Status: ACTIVE | Noted: 2021-10-06

## 2024-02-05 PROBLEM — E11.9 TYPE 2 DIABETES MELLITUS (MULTI): Status: RESOLVED | Noted: 2021-10-06 | Resolved: 2024-02-05

## 2024-02-05 PROBLEM — Z20.822 CONTACT WITH AND (SUSPECTED) EXPOSURE TO COVID-19: Status: RESOLVED | Noted: 2023-05-28 | Resolved: 2024-02-05

## 2024-02-05 PROBLEM — D68.51 ACTIVATED PROTEIN C RESISTANCE (MULTI): Status: ACTIVE | Noted: 2024-02-05

## 2024-02-05 PROBLEM — I48.0 PAROXYSMAL ATRIAL FIBRILLATION (MULTI): Status: ACTIVE | Noted: 2024-02-05

## 2024-02-05 PROBLEM — R68.89 ACTIVITY INTOLERANCE: Status: ACTIVE | Noted: 2024-02-05

## 2024-02-05 PROBLEM — F33.1 MODERATE RECURRENT MAJOR DEPRESSION (MULTI): Status: ACTIVE | Noted: 2021-10-26

## 2024-02-05 PROCEDURE — 3077F SYST BP >= 140 MM HG: CPT

## 2024-02-05 PROCEDURE — 1126F AMNT PAIN NOTED NONE PRSNT: CPT

## 2024-02-05 PROCEDURE — 3078F DIAST BP <80 MM HG: CPT

## 2024-02-05 PROCEDURE — 1036F TOBACCO NON-USER: CPT

## 2024-02-05 PROCEDURE — 99213 OFFICE O/P EST LOW 20 MIN: CPT

## 2024-02-05 PROCEDURE — 1160F RVW MEDS BY RX/DR IN RCRD: CPT

## 2024-02-05 PROCEDURE — 1159F MED LIST DOCD IN RCRD: CPT

## 2024-02-05 ASSESSMENT — ENCOUNTER SYMPTOMS
VOMITING: 0
CHILLS: 0
NAUSEA: 0
SHORTNESS OF BREATH: 0
FEVER: 0
PALPITATIONS: 0
FATIGUE: 0
DIARRHEA: 0

## 2024-02-05 ASSESSMENT — PAIN SCALES - GENERAL: PAINLEVEL: 0-NO PAIN

## 2024-02-05 NOTE — PROGRESS NOTES
"Subjective   Patient ID: Adriana Marcum is a 73 y.o. female who presents for Hypertension (Was running high while going to therapy ).    HPI   PMH remarkable for  COPD, HTN, HLD, GERD, factor V, depression who presents to the office today for high blood pressures while at therapy and emotion problem. Patient states she has been having high blood pressures while at cardiac rehab running in the 190s/200s. Has stopped going to cardiac rehab for fear of high blood pressure. Patient has been adhering to medication and taking BP at home running average 130s/80s. Denies chest pain, palpitations, headaches, blurred vision. Mentions her mail-in pharmacy sent her Lasix prescription as \"take daily\" instead of what was originally prescribed as \"Two times per week.\" She has been taking daily. Mentions weight has been about the same. Denies leg swelling. Has increased CROW when lifting objects like groceries and \"when she is in a hurry.\" Would like to know how she is supposed to take it. Last cardiology visit was in December 2023. Next Cardiology appointment is in June 2024.     Also, mentions increase in anger and frustration with her condition. Has been on Zoloft for 10 years and isn't sure if it is helping anymore. Does not want to take any more medication. Is open to talking to someone. Denies SI/HI.     Review of Systems   Constitutional:  Negative for chills, fatigue and fever.   Respiratory:  Negative for shortness of breath.    Cardiovascular:  Negative for chest pain, palpitations and leg swelling.   Gastrointestinal:  Negative for diarrhea, nausea and vomiting.   All other systems reviewed and are negative.      Objective   /70   Pulse 66   Resp 18   Ht 1.676 m (5' 6\")   Wt 102 kg (224 lb 6.4 oz)   SpO2 94%   BMI 36.22 kg/m²     Physical Exam  Vitals reviewed.   Constitutional:       Appearance: Normal appearance. She is obese.   HENT:      Head: Normocephalic and atraumatic.   Eyes:      Extraocular " Movements: Extraocular movements intact.      Conjunctiva/sclera: Conjunctivae normal.   Cardiovascular:      Rate and Rhythm: Normal rate and regular rhythm.      Pulses: Normal pulses.      Heart sounds: Normal heart sounds.   Pulmonary:      Effort: Pulmonary effort is normal.      Breath sounds: Normal breath sounds.   Musculoskeletal:      Cervical back: Normal range of motion and neck supple.   Neurological:      General: No focal deficit present.      Mental Status: She is alert and oriented to person, place, and time.   Psychiatric:         Mood and Affect: Mood normal.         Behavior: Behavior normal.         Thought Content: Thought content normal.         Judgment: Judgment normal.         Assessment/Plan   Problem List Items Addressed This Visit             ICD-10-CM    COPD (chronic obstructive pulmonary disease) (CMS/[facility]) J44.9    Hypertension - Primary I10     - BP machine was checked in office today. Patient is using properly and cuff is proper size.  - Continue to monitor blood pressure at home.  - Continue with current medications and doses. Will follow up about adjustment to Lasix dosage, whether it should be twice a week as originally prescribe or increased. Recommended patient to follow up with pharmacy to make sure prescriptions are correct.  - Patient understands seeking care at ER if symptoms worsen.  - Recommend continuing with cardiac rehab.  - Follow up in 4 months.          Depression F32.A     - Recommend follow up with psychiatry for mood and medication management.          Relevant Orders    Referral to Psychiatry    Acute on chronic diastolic (congestive) heart failure (CMS/[facility]) I50.33    Obesity, morbid (CMS/[facility]) E66.01

## 2024-02-05 NOTE — ASSESSMENT & PLAN NOTE
- BP machine was checked in office today. Patient is using properly and cuff is proper size.  - Continue to monitor blood pressure at home.  - Continue with current medications and doses. Will follow up about adjustment to Lasix dosage, whether it should be twice a week as originally prescribe or increased. Recommended patient to follow up with pharmacy to make sure prescriptions are correct.  - Patient understands seeking care at ER if symptoms worsen.  - Recommend continuing with cardiac rehab.  - Follow up in 4 months.

## 2024-02-11 DIAGNOSIS — Z95.1 HISTORY OF CORONARY ARTERY BYPASS GRAFT X 1: ICD-10-CM

## 2024-02-12 RX ORDER — METOPROLOL SUCCINATE 25 MG/1
TABLET, EXTENDED RELEASE ORAL
Qty: 45 TABLET | Refills: 3 | Status: SHIPPED | OUTPATIENT
Start: 2024-02-12

## 2024-03-17 DIAGNOSIS — J81.0 ACUTE PULMONARY EDEMA (MULTI): ICD-10-CM

## 2024-03-18 RX ORDER — FUROSEMIDE 20 MG/1
20 TABLET ORAL DAILY
Qty: 90 TABLET | Refills: 3 | Status: SHIPPED | OUTPATIENT
Start: 2024-03-18

## 2024-05-02 DIAGNOSIS — K21.9 GASTROESOPHAGEAL REFLUX DISEASE WITHOUT ESOPHAGITIS: ICD-10-CM

## 2024-05-02 RX ORDER — CALC/MAG/B COMPLEX/D3/HERB 61
15 TABLET ORAL DAILY
Qty: 90 CAPSULE | Refills: 0 | Status: SHIPPED | OUTPATIENT
Start: 2024-05-02

## 2024-07-15 DIAGNOSIS — K21.9 GASTROESOPHAGEAL REFLUX DISEASE WITHOUT ESOPHAGITIS: ICD-10-CM

## 2024-07-15 RX ORDER — CALC/MAG/B COMPLEX/D3/HERB 61
15 TABLET ORAL DAILY
Qty: 90 CAPSULE | Refills: 3 | Status: SHIPPED | OUTPATIENT
Start: 2024-07-15

## 2024-07-24 ENCOUNTER — HOSPITAL ENCOUNTER (OUTPATIENT)
Dept: RADIOLOGY | Facility: EXTERNAL LOCATION | Age: 74
Discharge: HOME | End: 2024-07-24

## 2024-07-24 DIAGNOSIS — M25.562 LEFT KNEE PAIN, UNSPECIFIED CHRONICITY: ICD-10-CM

## 2024-11-07 DIAGNOSIS — E78.00 HYPERCHOLESTEROLEMIA: ICD-10-CM

## 2024-11-07 DIAGNOSIS — F32.A DEPRESSION, UNSPECIFIED DEPRESSION TYPE: ICD-10-CM

## 2024-11-07 RX ORDER — ATORVASTATIN CALCIUM 80 MG/1
80 TABLET, FILM COATED ORAL DAILY
Qty: 60 TABLET | Refills: 0 | Status: SHIPPED | OUTPATIENT
Start: 2024-11-07

## 2024-11-07 RX ORDER — SERTRALINE HYDROCHLORIDE 100 MG/1
100 TABLET, FILM COATED ORAL DAILY
Qty: 60 TABLET | Refills: 0 | Status: SHIPPED | OUTPATIENT
Start: 2024-11-07

## 2024-12-01 DIAGNOSIS — Z95.1 HISTORY OF CORONARY ARTERY BYPASS GRAFT X 1: ICD-10-CM

## 2024-12-02 RX ORDER — METOPROLOL SUCCINATE 25 MG/1
TABLET, EXTENDED RELEASE ORAL
Qty: 45 TABLET | Refills: 0 | Status: SHIPPED | OUTPATIENT
Start: 2024-12-02

## 2024-12-05 PROBLEM — Z09 HOSPITAL DISCHARGE FOLLOW-UP: Status: RESOLVED | Noted: 2023-06-06 | Resolved: 2024-12-05

## 2024-12-05 PROBLEM — M25.559 HIP PAIN: Status: RESOLVED | Noted: 2021-10-06 | Resolved: 2024-12-05

## 2024-12-05 RX ORDER — LISINOPRIL 40 MG/1
40 TABLET ORAL DAILY
COMMUNITY
Start: 2024-11-07

## 2024-12-05 RX ORDER — AMLODIPINE BESYLATE 5 MG/1
5 TABLET ORAL DAILY
COMMUNITY
Start: 2024-11-07

## 2024-12-09 PROBLEM — R79.89 ELEVATED TROPONIN LEVEL: Status: RESOLVED | Noted: 2023-11-02 | Resolved: 2024-12-09

## 2024-12-09 PROBLEM — I21.4 NSTEMI, INITIAL EPISODE OF CARE (MULTI): Status: RESOLVED | Noted: 2023-11-02 | Resolved: 2024-12-09

## 2024-12-09 PROBLEM — F33.1 MODERATE RECURRENT MAJOR DEPRESSION: Status: RESOLVED | Noted: 2021-10-26 | Resolved: 2024-12-09

## 2024-12-09 PROBLEM — E87.1 HYPONATREMIA: Status: RESOLVED | Noted: 2023-11-02 | Resolved: 2024-12-09

## 2024-12-09 PROBLEM — E66.01 OBESITY, MORBID (MULTI): Status: RESOLVED | Noted: 2024-02-05 | Resolved: 2024-12-09

## 2024-12-09 NOTE — PROGRESS NOTES
Patient ID:   Adriana Marcum is a 74 y.o. female with PMH remarkable for COPD, HTN, HLD, GERD, CAD s/p CABG x1 5/2023, factor V, depression who presents to the office today for Medicare Annual Wellness Visit Subsequent (Handicap placard).    HEALTH MAINTENANCE: Annual Medicare Wellness Physical  Last Office Visit: 2/5/2024 seen by my colleague for elevated BP at cardiac rehab. Referral for psych was placed d/t depression.  Mammogram (40-75): never had d/t personal preference. Now out of age range.  Pap smear (21-65, or hysterectomy q5yrs): h/o hysterectomy  Last Labs: 11/3/2024 -> DUE  HgbA1c 6.0 on 11/3/2023 -> DUE  Colonoscopy (45-75 or age 40 with 1st degree relative dx colon ca): Declines. Now out of age range.  Lung cancer screening (50-76 y/o x 20 pk yr for at least 20 yrs + current smoker OR quit in last 15 years, no CT w/I last year): 5/11/2023 showing Areas of small peripheral nodularity in the right upper lobe, largest of 0.6 cm, may relate to the underlying pulmonary edema.   DEXA (65+, q 2 years): DUE - declines  On 5/24/2023 pt underwent left aaron-lateral mini thoracotomy, mid cab procedure - LIMA to LAD, rib re approximation with 0-ticron sutures and closure of subcutaneous/subcuticular tissue.   - 2Echo on 5/13/2023 showed LVSF 45-50%, mid and apical ant wall, mid and apical anterior septum and apex are abnormal. Pseudonormal pattern of LVDF, ICMO, moderate MVR, mild AVS.   - she underwent CABG X 1 in May 2023  -- follows with Dr Melton, Cardio    Reports that she has bilateral hand pain, and joint pain. Knees are bone on bone. Supposed to have surgery next month with Dr Meredith on 1/20/2024. May see another doc  Walking is off balance.     Past Medical History:   Diagnosis Date    Activated protein C resistance (Multi) 02/05/2024    Acute postoperative pain 11/02/2023    Contact with and (suspected) exposure to covid-19 05/28/2023    COPD (chronic obstructive pulmonary disease) (Multi) 08/25/2021     Coronary artery disease involving coronary bypass graft of native heart without angina pectoris 2023    Gastroesophageal reflux disease without esophagitis 2023    HFrEF (heart failure with reduced ejection fraction) 2023    History of factor V Leiden mutation 2023    Hypercholesterolemia 2023    Hypertension 2023    Hyponatremia 2023    Moderate recurrent major depression 10/26/2021    NSTEMI, initial episode of care (Multi) 2023    Paroxysmal atrial fibrillation (Multi) 2024    Respiratory failure (Multi) 2023      Past Surgical History:   Procedure Laterality Date    CATARACT EXTRACTION Bilateral     CORONARY ARTERY BYPASS GRAFT  2023    FOOT SURGERY      HYSTERECTOMY      PARTIAL HIP ARTHROPLASTY Right     SKIN CANCER EXCISION      on nose      Social History     Tobacco Use    Smoking status: Former     Current packs/day: 0.00     Types: Cigarettes     Quit date:      Years since quittin.9    Smokeless tobacco: Never   Substance Use Topics    Alcohol use: Yes     Comment: ocassional    Drug use: Never     Family History   Problem Relation Name Age of Onset    Stomach cancer Mother      Other (mets cancer) Mother      Colon cancer Father      Heart attack Father      Colon cancer Brother      Other (mets cancer) Brother        Immunization History   Administered Date(s) Administered    Flu vaccine, trivalent, preservative free, HIGH-DOSE, age 65y+ (Fluzone) 2024    Influenza, Seasonal, Quadrivalent, Adjuvanted 10/10/2023    Influenza, injectable, quadrivalent 10/04/2017, 10/11/2018, 2021    Pfizer COVID-19 vaccine, 12 years and older, (30mcg/0.3mL) (Comirnaty) 10/10/2023, 2024    Pfizer Purple Cap SARS-CoV-2 2021, 03/10/2021, 2021    Pneumococcal conjugate vaccine, 13-valent (PREVNAR 13) 2017    Pneumococcal polysaccharide vaccine, 23-valent, age 2 years and older (PNEUMOVAX 23) 2016    RSV, 60  "Years And Older (AREXVY) 09/26/2024     Review of Systems   Constitutional: Negative.    HENT: Negative.     Eyes: Negative.    Respiratory: Negative.     Cardiovascular: Negative.    Gastrointestinal: Negative.    Endocrine: Negative.    Genitourinary: Negative.    Musculoskeletal: Negative.    Skin: Negative.    Neurological: Negative.    Psychiatric/Behavioral: Negative.     All other systems reviewed and are negative.    Allergies   Allergen Reactions    Oxycodone-Acetaminophen Other     tachycardia    Celebrex [Celecoxib] Other     Stomach upset    Hydrochlorothiazide Hives and Rash     Visit Vitals  /78 (BP Location: Left arm, Patient Position: Sitting)   Pulse 60   Resp 18   Ht 1.676 m (5' 6\")   Wt 101 kg (223 lb)   SpO2 95%   BMI 35.99 kg/m²   Smoking Status Former   BSA 2.17 m²     Physical Exam  Vitals reviewed. Exam conducted with a chaperone present.   Constitutional:       Appearance: Normal appearance. She is well-developed.   HENT:      Head: Normocephalic.      Right Ear: External ear normal.      Left Ear: External ear normal.      Nose: Nose normal.      Mouth/Throat:      Lips: Pink.      Mouth: Mucous membranes are moist.   Eyes:      General: Lids are normal.      Pupils: Pupils are equal, round, and reactive to light.   Neck:      Trachea: Trachea normal.   Cardiovascular:      Rate and Rhythm: Normal rate and regular rhythm.      Heart sounds: Normal heart sounds.   Pulmonary:      Effort: Pulmonary effort is normal.      Breath sounds: Normal breath sounds.   Abdominal:      General: Bowel sounds are normal.      Palpations: Abdomen is soft.   Musculoskeletal:      Cervical back: Full passive range of motion without pain.   Skin:     General: Skin is warm and moist.   Neurological:      General: No focal deficit present.      Mental Status: She is alert and oriented to person, place, and time. Mental status is at baseline.   Psychiatric:         Attention and Perception: Attention " normal.         Mood and Affect: Mood normal.         Speech: Speech normal.         Behavior: Behavior is cooperative.         Thought Content: Thought content normal.         Cognition and Memory: Cognition normal.         Judgment: Judgment normal.       Current Outpatient Medications   Medication Instructions    acetaminophen (TYLENOL) 650 mg, Every 6 hours PRN    amLODIPine (NORVASC) 5 mg, Daily    aspirin 81 mg, Daily RT    atorvastatin (LIPITOR) 80 mg, oral, Daily    clopidogrel (PLAVIX) 75 mg, Daily    fluticasone-umeclidin-vilanter (Trelegy Ellipta) 100-62.5-25 mcg blister with device INHALE 1 PUFF ONE TIME DAILY    furosemide (LASIX) 20 mg, oral, Daily    gabapentin (Neurontin) 100 mg capsule Take 1 capsule (100mg) at night for 5 days then INCREASE to 200mg (two capsules) at night    lansoprazole (PREVACID) 15 mg, oral, Daily    lisinopril 40 mg, Daily    melatonin 5 mg, Nightly PRN    metoprolol succinate XL (Toprol-XL) 25 mg 24 hr tablet TAKE 1/2 TABLET ONE TIME DAILY. DO NOT CRUSH OR CHEW. HOLD IF DIZZY, LIGHTHEADED    sertraline (ZOLOFT) 100 mg, oral, Daily     Lab Results   Component Value Date    WBC 7.9 11/03/2023    HGB 11.3 (L) 11/03/2023    HCT 34.5 (L) 11/03/2023     11/03/2023    CHOL 168 05/13/2023    TRIG 95 05/13/2023    HDL 59.1 05/13/2023    ALT 26 05/18/2023    AST 17 05/18/2023     (L) 11/03/2023    K 4.0 11/03/2023    CL 98 11/03/2023    CREATININE 0.73 11/03/2023    BUN 21 11/03/2023    CO2 25 11/03/2023    TSH 4.07 (H) 05/18/2023    INR 1.1 05/24/2023    HGBA1C 6.0 (H) 11/03/2023       Problem List Items Addressed This Visit             ICD-10-CM    COPD (chronic obstructive pulmonary disease) (Multi) J44.9     - c/w inhalers  - stable         Relevant Orders    Disability Placard    Hypertension I10     - c/w norvasc, lisinopril, metoprolol   - All strategies to keep the blood pressure down is explained to the patient  - Regular exercise and blood pressure monitoring is  "encouraged  - Education and counseling was given to the patient  - advised to monitor blood pressure three times a week for three weeks and then let us know how it runs (send a [employer] message of a picture of the readings, drop recordings off to the office and/or call with them).   Visit Vitals  /78 (BP Location: Left arm, Patient Position: Sitting)   Pulse 60   Resp 18   Ht 1.676 m (5' 6\")   Wt 101 kg (223 lb)   SpO2 95%   BMI 35.99 kg/m²   Smoking Status Former   BSA 2.17 m²             Hypercholesterolemia E78.00     - c/w statin  - reviewed last lipid panel  - Patient educated and counseled to do walking and exercise regularly  - Low-fat low-cholesterol diet is advised  - Advised to reduce weight   - The goal is to keep the LDL less than 70, and HDL the more than 40         Relevant Orders    Lipid Panel    HFrEF (heart failure with reduced ejection fraction) I50.20     - c/w lasix for fluid balance.          Relevant Medications    amLODIPine (Norvasc) 5 mg tablet    History of factor V Leiden mutation Z86.2     - stable         Relevant Orders    CBC and Auto Differential    Coronary artery disease involving coronary bypass graft of native heart without angina pectoris I25.810     #CAD s/p CABG x1 5/2023  - c/w baby ASA and statin, plavix         Relevant Medications    amLODIPine (Norvasc) 5 mg tablet    Other Relevant Orders    Disability Placard    Paroxysmal atrial fibrillation (Multi) I48.0     - c/w metoprolol for rate control          Relevant Medications    amLODIPine (Norvasc) 5 mg tablet    Encounter for annual wellness visit (AWV) in Medicare patient Z00.00    Relevant Orders    Hemoglobin A1c    CBC and Auto Differential    Comprehensive Metabolic Panel    Lipid Panel    TSH with reflex to Free T4 if abnormal    Vitamin D 25-Hydroxy,Total (for eval of Vitamin D levels)    Vitamin B12    Arthralgia of both hands M25.541, M25.542     - will out rheumatological issue  - daughter x2 have " rheumatoid arthritis and one has lupus         Relevant Medications    gabapentin (Neurontin) 100 mg capsule    Other Relevant Orders    Rheumatoid factor    GEORGE with Reflex to JEFF    Disability Placard    Sedimentation Rate    C-reactive protein    Citrulline Antibody, IgG     Other Visit Diagnoses         Codes    Disorder of bone, unspecified     M89.9    Relevant Orders    Vitamin D 25-Hydroxy,Total (for eval of Vitamin D levels)    Disability Placard    Other specified menopausal and perimenopausal disorders     N95.8          --------------------  Written by Zonia Ortiz RN, acting as a scribe for Dr. Guerrero. This note accurately reflects the work and decisions made by Dr. Guerrero.     I, Dr. Guerrero, attest all medical record entries made by the scribe were under my direction and were personally dictated by me. I have reviewed the chart and agree that the record accurately reflects my performance of the history, physical exam, and assessment and plan.

## 2024-12-10 ENCOUNTER — APPOINTMENT (OUTPATIENT)
Dept: PRIMARY CARE | Facility: CLINIC | Age: 74
End: 2024-12-10
Payer: MEDICARE

## 2024-12-10 VITALS
BODY MASS INDEX: 35.84 KG/M2 | HEIGHT: 66 IN | HEART RATE: 60 BPM | WEIGHT: 223 LBS | SYSTOLIC BLOOD PRESSURE: 132 MMHG | DIASTOLIC BLOOD PRESSURE: 78 MMHG | RESPIRATION RATE: 18 BRPM | OXYGEN SATURATION: 95 %

## 2024-12-10 DIAGNOSIS — J44.9 CHRONIC OBSTRUCTIVE PULMONARY DISEASE, UNSPECIFIED COPD TYPE (MULTI): ICD-10-CM

## 2024-12-10 DIAGNOSIS — D68.51 ACTIVATED PROTEIN C RESISTANCE (MULTI): ICD-10-CM

## 2024-12-10 DIAGNOSIS — I48.0 PAROXYSMAL ATRIAL FIBRILLATION (MULTI): ICD-10-CM

## 2024-12-10 DIAGNOSIS — M89.9 DISORDER OF BONE, UNSPECIFIED: ICD-10-CM

## 2024-12-10 DIAGNOSIS — E78.00 HYPERCHOLESTEROLEMIA: ICD-10-CM

## 2024-12-10 DIAGNOSIS — I25.810 CORONARY ARTERY DISEASE INVOLVING CORONARY BYPASS GRAFT OF NATIVE HEART WITHOUT ANGINA PECTORIS: ICD-10-CM

## 2024-12-10 DIAGNOSIS — M25.541 ARTHRALGIA OF BOTH HANDS: ICD-10-CM

## 2024-12-10 DIAGNOSIS — Z00.00 ENCOUNTER FOR ANNUAL WELLNESS VISIT (AWV) IN MEDICARE PATIENT: ICD-10-CM

## 2024-12-10 DIAGNOSIS — N95.8 OTHER SPECIFIED MENOPAUSAL AND PERIMENOPAUSAL DISORDERS: ICD-10-CM

## 2024-12-10 DIAGNOSIS — I50.20 HFREF (HEART FAILURE WITH REDUCED EJECTION FRACTION): ICD-10-CM

## 2024-12-10 DIAGNOSIS — I10 HYPERTENSION, UNSPECIFIED TYPE: ICD-10-CM

## 2024-12-10 DIAGNOSIS — M25.542 ARTHRALGIA OF BOTH HANDS: ICD-10-CM

## 2024-12-10 DIAGNOSIS — Z86.2 HISTORY OF FACTOR V LEIDEN MUTATION: ICD-10-CM

## 2024-12-10 PROCEDURE — 1170F FXNL STATUS ASSESSED: CPT | Performed by: INTERNAL MEDICINE

## 2024-12-10 PROCEDURE — 1036F TOBACCO NON-USER: CPT | Performed by: INTERNAL MEDICINE

## 2024-12-10 PROCEDURE — 1159F MED LIST DOCD IN RCRD: CPT | Performed by: INTERNAL MEDICINE

## 2024-12-10 PROCEDURE — 1160F RVW MEDS BY RX/DR IN RCRD: CPT | Performed by: INTERNAL MEDICINE

## 2024-12-10 PROCEDURE — 99214 OFFICE O/P EST MOD 30 MIN: CPT | Performed by: INTERNAL MEDICINE

## 2024-12-10 PROCEDURE — 3078F DIAST BP <80 MM HG: CPT | Performed by: INTERNAL MEDICINE

## 2024-12-10 PROCEDURE — 1123F ACP DISCUSS/DSCN MKR DOCD: CPT | Performed by: INTERNAL MEDICINE

## 2024-12-10 PROCEDURE — G0439 PPPS, SUBSEQ VISIT: HCPCS | Performed by: INTERNAL MEDICINE

## 2024-12-10 PROCEDURE — 1125F AMNT PAIN NOTED PAIN PRSNT: CPT | Performed by: INTERNAL MEDICINE

## 2024-12-10 PROCEDURE — 3075F SYST BP GE 130 - 139MM HG: CPT | Performed by: INTERNAL MEDICINE

## 2024-12-10 RX ORDER — ACETAMINOPHEN 500 MG
5 TABLET ORAL NIGHTLY PRN
COMMUNITY

## 2024-12-10 RX ORDER — GABAPENTIN 100 MG/1
CAPSULE ORAL
Qty: 60 CAPSULE | Refills: 0 | Status: SHIPPED | OUTPATIENT
Start: 2024-12-10

## 2024-12-10 ASSESSMENT — ACTIVITIES OF DAILY LIVING (ADL)
TAKING_MEDICATION: INDEPENDENT
DRESSING: INDEPENDENT
GROCERY_SHOPPING: INDEPENDENT
MANAGING_FINANCES: INDEPENDENT
BATHING: INDEPENDENT
DOING_HOUSEWORK: INDEPENDENT

## 2024-12-10 ASSESSMENT — ENCOUNTER SYMPTOMS
RESPIRATORY NEGATIVE: 1
CARDIOVASCULAR NEGATIVE: 1
PSYCHIATRIC NEGATIVE: 1
NEUROLOGICAL NEGATIVE: 1
CONSTITUTIONAL NEGATIVE: 1
EYES NEGATIVE: 1
MUSCULOSKELETAL NEGATIVE: 1
ENDOCRINE NEGATIVE: 1
GASTROINTESTINAL NEGATIVE: 1

## 2024-12-10 ASSESSMENT — PAIN SCALES - GENERAL: PAINLEVEL_OUTOF10: 8

## 2024-12-10 ASSESSMENT — PATIENT HEALTH QUESTIONNAIRE - PHQ9
1. LITTLE INTEREST OR PLEASURE IN DOING THINGS: NOT AT ALL
2. FEELING DOWN, DEPRESSED OR HOPELESS: SEVERAL DAYS
SUM OF ALL RESPONSES TO PHQ9 QUESTIONS 1 AND 2: 1

## 2024-12-10 NOTE — PATIENT INSTRUCTIONS
It was nice to see you in the office today!  As discussed during our visit...     STOP the advil, ibuprofen, please.  START on tylenol arthritis two tabs three times a day.  START on gabapentin 100mg at night for 5 days then INCREASE to 200mg (two capsules) at night  --------------------------------  Please have your 12 hour FASTING blood drawn in the next couple weeks.  You do NOT need an appointment for lab work and/or Xray's.  Do not get lab work done while you are on steroids (prednisone, medrol dose pack, dexamethasone) unless instructed differently by Dr Guerrero because this can cause some labs to be off.  Hold Biotin, B vitamins, B Complex for 2-3 days before any blood draw (this can cause false thyroid function tests)  During the 12 hour FASTING time, you may have BLACK coffee, BLACK tea and/or water at any time.    We will contact you with the results of your blood work (once they have ALL finalized & Dr Guerrero has reviewed them) and let you know of any necessary adjustments need to be done to your plan of care.  If you do not hear from us within 3-5 days business days of having your blood drawn, please call the [place] office at [phone].     The two nearest Outpatient Lab's to THIS office is:  [facility] (this is the Urgent Care building by [employer])  [address] [place] [address] [phone]    Hours:   Monday through Friday 7:30am - 4pm (they usually close @ 12:30-1pm for lunch)     Or you can go to ...  [place] [id number]  [place] [address] [phone]    Hours:   Monday through Friday 7am - 4:30pm (Closed 12:30-1pm for lunch)   Saturday 8am - 12pm     Click the blue link to take you to the website to confirm hours/location Locations of  lab facilities  The  [employer] [facility] offers affordable laboratory tests.   Financial assistance is also available to those who meet financial eligibility requirements by  submitting an [facility] Application or calling, [phone].   Click this link to Get a Price Estimate Today on what lab work may cost you.   ------------------

## 2024-12-10 NOTE — ASSESSMENT & PLAN NOTE
"- c/w norvasc, lisinopril, metoprolol   - All strategies to keep the blood pressure down is explained to the patient  - Regular exercise and blood pressure monitoring is encouraged  - Education and counseling was given to the patient  - advised to monitor blood pressure three times a week for three weeks and then let us know how it runs (send a [employer] message of a picture of the readings, drop recordings off to the office and/or call with them).   Visit Vitals  /78 (BP Location: Left arm, Patient Position: Sitting)   Pulse 60   Resp 18   Ht 1.676 m (5' 6\")   Wt 101 kg (223 lb)   SpO2 95%   BMI 35.99 kg/m²   Smoking Status Former   BSA 2.17 m²      "

## 2024-12-10 NOTE — ASSESSMENT & PLAN NOTE
- c/w statin  - reviewed last lipid panel  - Patient educated and counseled to do walking and exercise regularly  - Low-fat low-cholesterol diet is advised  - Advised to reduce weight   - The goal is to keep the LDL less than 70, and HDL the more than 40

## 2024-12-16 ENCOUNTER — LAB (OUTPATIENT)
Dept: LAB | Facility: LAB | Age: 74
End: 2024-12-16
Payer: MEDICARE

## 2024-12-16 ENCOUNTER — TELEPHONE (OUTPATIENT)
Dept: PRIMARY CARE | Facility: CLINIC | Age: 74
End: 2024-12-16

## 2024-12-16 DIAGNOSIS — M25.542 ARTHRALGIA OF BOTH HANDS: ICD-10-CM

## 2024-12-16 DIAGNOSIS — Z86.2 HISTORY OF FACTOR V LEIDEN MUTATION: ICD-10-CM

## 2024-12-16 DIAGNOSIS — M89.9 DISORDER OF BONE, UNSPECIFIED: ICD-10-CM

## 2024-12-16 DIAGNOSIS — M25.541 ARTHRALGIA OF BOTH HANDS: ICD-10-CM

## 2024-12-16 DIAGNOSIS — E78.00 HYPERCHOLESTEROLEMIA: ICD-10-CM

## 2024-12-16 DIAGNOSIS — Z00.00 ENCOUNTER FOR ANNUAL WELLNESS VISIT (AWV) IN MEDICARE PATIENT: ICD-10-CM

## 2024-12-16 LAB
25(OH)D3 SERPL-MCNC: 32 NG/ML (ref 30–100)
ALBUMIN SERPL BCP-MCNC: 4.9 G/DL (ref 3.4–5)
ALP SERPL-CCNC: 74 U/L (ref 33–136)
ALT SERPL W P-5'-P-CCNC: 13 U/L (ref 7–45)
ANION GAP SERPL CALC-SCNC: 13 MMOL/L (ref 10–20)
AST SERPL W P-5'-P-CCNC: 15 U/L (ref 9–39)
BASOPHILS # BLD AUTO: 0.05 X10*3/UL (ref 0–0.1)
BASOPHILS NFR BLD AUTO: 0.8 %
BILIRUB SERPL-MCNC: 0.5 MG/DL (ref 0–1.2)
BUN SERPL-MCNC: 21 MG/DL (ref 6–23)
CALCIUM SERPL-MCNC: 10 MG/DL (ref 8.6–10.3)
CCP IGG SERPL-ACNC: <1 U/ML
CHLORIDE SERPL-SCNC: 98 MMOL/L (ref 98–107)
CHOLEST SERPL-MCNC: 183 MG/DL (ref 0–199)
CHOLESTEROL/HDL RATIO: 4.2
CO2 SERPL-SCNC: 26 MMOL/L (ref 21–32)
CREAT SERPL-MCNC: 0.83 MG/DL (ref 0.5–1.05)
CRP SERPL-MCNC: 0.46 MG/DL
EGFRCR SERPLBLD CKD-EPI 2021: 73 ML/MIN/1.73M*2
EOSINOPHIL # BLD AUTO: 0.09 X10*3/UL (ref 0–0.4)
EOSINOPHIL NFR BLD AUTO: 1.5 %
ERYTHROCYTE [DISTWIDTH] IN BLOOD BY AUTOMATED COUNT: 13.9 % (ref 11.5–14.5)
ERYTHROCYTE [SEDIMENTATION RATE] IN BLOOD BY WESTERGREN METHOD: 34 MM/H (ref 0–30)
EST. AVERAGE GLUCOSE BLD GHB EST-MCNC: 128 MG/DL
GLUCOSE SERPL-MCNC: 136 MG/DL (ref 74–99)
HBA1C MFR BLD: 6.1 %
HCT VFR BLD AUTO: 35.3 % (ref 36–46)
HDLC SERPL-MCNC: 43.2 MG/DL
HGB BLD-MCNC: 11.7 G/DL (ref 12–16)
IMM GRANULOCYTES # BLD AUTO: 0.04 X10*3/UL (ref 0–0.5)
IMM GRANULOCYTES NFR BLD AUTO: 0.7 % (ref 0–0.9)
LDLC SERPL CALC-MCNC: 106 MG/DL
LYMPHOCYTES # BLD AUTO: 1.32 X10*3/UL (ref 0.8–3)
LYMPHOCYTES NFR BLD AUTO: 21.8 %
MCH RBC QN AUTO: 30.2 PG (ref 26–34)
MCHC RBC AUTO-ENTMCNC: 33.1 G/DL (ref 32–36)
MCV RBC AUTO: 91 FL (ref 80–100)
MONOCYTES # BLD AUTO: 0.39 X10*3/UL (ref 0.05–0.8)
MONOCYTES NFR BLD AUTO: 6.4 %
NEUTROPHILS # BLD AUTO: 4.17 X10*3/UL (ref 1.6–5.5)
NEUTROPHILS NFR BLD AUTO: 68.8 %
NON HDL CHOLESTEROL: 140 MG/DL (ref 0–149)
NRBC BLD-RTO: 0 /100 WBCS (ref 0–0)
PLATELET # BLD AUTO: 182 X10*3/UL (ref 150–450)
POTASSIUM SERPL-SCNC: 4.6 MMOL/L (ref 3.5–5.3)
PROT SERPL-MCNC: 8 G/DL (ref 6.4–8.2)
RBC # BLD AUTO: 3.88 X10*6/UL (ref 4–5.2)
RHEUMATOID FACT SER NEPH-ACNC: 11 IU/ML (ref 0–15)
SODIUM SERPL-SCNC: 132 MMOL/L (ref 136–145)
TRIGL SERPL-MCNC: 171 MG/DL (ref 0–149)
TSH SERPL-ACNC: 3.09 MIU/L (ref 0.44–3.98)
VIT B12 SERPL-MCNC: 298 PG/ML (ref 211–911)
VLDL: 34 MG/DL (ref 0–40)
WBC # BLD AUTO: 6.1 X10*3/UL (ref 4.4–11.3)

## 2024-12-16 PROCEDURE — 83036 HEMOGLOBIN GLYCOSYLATED A1C: CPT

## 2024-12-16 PROCEDURE — 36415 COLL VENOUS BLD VENIPUNCTURE: CPT

## 2024-12-16 PROCEDURE — 82306 VITAMIN D 25 HYDROXY: CPT

## 2024-12-16 PROCEDURE — 86431 RHEUMATOID FACTOR QUANT: CPT

## 2024-12-16 PROCEDURE — 86038 ANTINUCLEAR ANTIBODIES: CPT

## 2024-12-16 PROCEDURE — 86200 CCP ANTIBODY: CPT

## 2024-12-16 PROCEDURE — 82607 VITAMIN B-12: CPT

## 2024-12-17 DIAGNOSIS — R73.9 HYPERGLYCEMIA: Primary | ICD-10-CM

## 2024-12-17 LAB — ANA SER QL HEP2 SUBST: NEGATIVE

## 2024-12-17 RX ORDER — METFORMIN HYDROCHLORIDE 500 MG/1
500 TABLET, EXTENDED RELEASE ORAL
Qty: 100 TABLET | Refills: 0 | Status: SHIPPED | OUTPATIENT
Start: 2024-12-17 | End: 2026-01-21

## 2024-12-29 DIAGNOSIS — F32.A DEPRESSION, UNSPECIFIED DEPRESSION TYPE: ICD-10-CM

## 2024-12-29 DIAGNOSIS — E78.00 HYPERCHOLESTEROLEMIA: ICD-10-CM

## 2024-12-29 DIAGNOSIS — J81.0 ACUTE PULMONARY EDEMA: ICD-10-CM

## 2024-12-30 RX ORDER — FUROSEMIDE 20 MG/1
TABLET ORAL
Qty: 90 TABLET | Refills: 0 | Status: SHIPPED | OUTPATIENT
Start: 2024-12-30

## 2024-12-30 RX ORDER — SERTRALINE HYDROCHLORIDE 100 MG/1
100 TABLET, FILM COATED ORAL DAILY
Qty: 90 TABLET | Refills: 0 | Status: SHIPPED | OUTPATIENT
Start: 2024-12-30

## 2024-12-30 RX ORDER — ATORVASTATIN CALCIUM 80 MG/1
80 TABLET, FILM COATED ORAL DAILY
Qty: 90 TABLET | Refills: 0 | Status: SHIPPED | OUTPATIENT
Start: 2024-12-30

## 2025-01-02 PROBLEM — I50.33 ACUTE ON CHRONIC DIASTOLIC CONGESTIVE HEART FAILURE: Status: ACTIVE | Noted: 2025-01-02

## 2025-01-06 ENCOUNTER — APPOINTMENT (OUTPATIENT)
Dept: PRIMARY CARE | Facility: CLINIC | Age: 75
End: 2025-01-06
Payer: MEDICARE

## 2025-01-13 ENCOUNTER — APPOINTMENT (OUTPATIENT)
Dept: PRIMARY CARE | Facility: CLINIC | Age: 75
End: 2025-01-13
Payer: MEDICARE

## 2025-01-14 ENCOUNTER — APPOINTMENT (OUTPATIENT)
Dept: PRIMARY CARE | Facility: CLINIC | Age: 75
End: 2025-01-14
Payer: MEDICARE

## 2025-01-20 ENCOUNTER — APPOINTMENT (OUTPATIENT)
Dept: PRIMARY CARE | Facility: CLINIC | Age: 75
End: 2025-01-20
Payer: MEDICARE

## 2025-01-21 ENCOUNTER — APPOINTMENT (OUTPATIENT)
Dept: PRIMARY CARE | Facility: CLINIC | Age: 75
End: 2025-01-21
Payer: MEDICARE

## 2025-01-28 ENCOUNTER — APPOINTMENT (OUTPATIENT)
Dept: PRIMARY CARE | Facility: CLINIC | Age: 75
End: 2025-01-28
Payer: MEDICARE

## 2025-01-28 DIAGNOSIS — E53.8 VITAMIN B 12 DEFICIENCY: ICD-10-CM

## 2025-01-28 PROCEDURE — 96372 THER/PROPH/DIAG INJ SC/IM: CPT | Performed by: INTERNAL MEDICINE

## 2025-01-28 RX ORDER — CYANOCOBALAMIN 1000 UG/ML
1000 INJECTION, SOLUTION INTRAMUSCULAR; SUBCUTANEOUS ONCE
Status: COMPLETED | OUTPATIENT
Start: 2025-01-28 | End: 2025-01-28

## 2025-01-28 RX ADMIN — CYANOCOBALAMIN 1000 MCG: 1000 INJECTION, SOLUTION INTRAMUSCULAR; SUBCUTANEOUS at 10:25

## 2025-02-05 ENCOUNTER — APPOINTMENT (OUTPATIENT)
Dept: PRIMARY CARE | Facility: CLINIC | Age: 75
End: 2025-02-05
Payer: MEDICARE

## 2025-02-05 DIAGNOSIS — E53.8 VITAMIN B 12 DEFICIENCY: ICD-10-CM

## 2025-02-05 PROCEDURE — 96372 THER/PROPH/DIAG INJ SC/IM: CPT | Performed by: INTERNAL MEDICINE

## 2025-02-05 RX ADMIN — CYANOCOBALAMIN 1000 MCG: 1000 INJECTION, SOLUTION INTRAMUSCULAR; SUBCUTANEOUS at 16:46

## 2025-02-11 RX ORDER — CYANOCOBALAMIN 1000 UG/ML
1000 INJECTION, SOLUTION INTRAMUSCULAR; SUBCUTANEOUS ONCE
Status: COMPLETED | OUTPATIENT
Start: 2025-02-05 | End: 2025-02-05

## 2025-02-12 ENCOUNTER — APPOINTMENT (OUTPATIENT)
Dept: PRIMARY CARE | Facility: CLINIC | Age: 75
End: 2025-02-12
Payer: MEDICARE

## 2025-02-12 DIAGNOSIS — E53.8 VITAMIN B 12 DEFICIENCY: Primary | ICD-10-CM

## 2025-02-12 PROCEDURE — 96372 THER/PROPH/DIAG INJ SC/IM: CPT | Performed by: INTERNAL MEDICINE

## 2025-02-12 RX ORDER — CYANOCOBALAMIN 1000 UG/ML
1000 INJECTION, SOLUTION INTRAMUSCULAR; SUBCUTANEOUS ONCE
Status: COMPLETED | OUTPATIENT
Start: 2025-02-12 | End: 2025-02-12

## 2025-02-12 RX ADMIN — CYANOCOBALAMIN 1000 MCG: 1000 INJECTION, SOLUTION INTRAMUSCULAR; SUBCUTANEOUS at 10:36

## 2025-02-14 DIAGNOSIS — Z95.1 HISTORY OF CORONARY ARTERY BYPASS GRAFT X 1: ICD-10-CM

## 2025-02-14 RX ORDER — METOPROLOL SUCCINATE 25 MG/1
TABLET, EXTENDED RELEASE ORAL
Qty: 45 TABLET | Refills: 1 | Status: SHIPPED | OUTPATIENT
Start: 2025-02-14

## 2025-03-03 DIAGNOSIS — R73.9 HYPERGLYCEMIA: ICD-10-CM

## 2025-03-03 RX ORDER — METFORMIN HYDROCHLORIDE 500 MG/1
TABLET, EXTENDED RELEASE ORAL
Qty: 90 TABLET | Refills: 0 | Status: SHIPPED | OUTPATIENT
Start: 2025-03-03

## 2025-03-14 DIAGNOSIS — F32.A DEPRESSION, UNSPECIFIED DEPRESSION TYPE: ICD-10-CM

## 2025-03-14 DIAGNOSIS — E78.00 HYPERCHOLESTEROLEMIA: ICD-10-CM

## 2025-03-14 DIAGNOSIS — J44.9 CHRONIC OBSTRUCTIVE PULMONARY DISEASE, UNSPECIFIED COPD TYPE (MULTI): ICD-10-CM

## 2025-03-14 DIAGNOSIS — J81.0 ACUTE PULMONARY EDEMA: ICD-10-CM

## 2025-03-14 RX ORDER — ATORVASTATIN CALCIUM 80 MG/1
80 TABLET, FILM COATED ORAL DAILY
Qty: 90 TABLET | Refills: 0 | Status: SHIPPED | OUTPATIENT
Start: 2025-03-14

## 2025-03-14 RX ORDER — FLUTICASONE FUROATE, UMECLIDINIUM BROMIDE AND VILANTEROL TRIFENATATE 100; 62.5; 25 UG/1; UG/1; UG/1
POWDER RESPIRATORY (INHALATION)
Qty: 60 EACH | Refills: 0 | Status: SHIPPED | OUTPATIENT
Start: 2025-03-14

## 2025-03-14 RX ORDER — FUROSEMIDE 20 MG/1
20 TABLET ORAL DAILY
Qty: 90 TABLET | Refills: 0 | Status: SHIPPED | OUTPATIENT
Start: 2025-03-14

## 2025-03-14 RX ORDER — SERTRALINE HYDROCHLORIDE 100 MG/1
100 TABLET, FILM COATED ORAL DAILY
Qty: 90 TABLET | Refills: 0 | Status: SHIPPED | OUTPATIENT
Start: 2025-03-14

## 2025-03-21 ENCOUNTER — CLINICAL SUPPORT (OUTPATIENT)
Dept: PRIMARY CARE | Facility: CLINIC | Age: 75
End: 2025-03-21
Payer: MEDICARE

## 2025-03-21 DIAGNOSIS — E53.8 VITAMIN B 12 DEFICIENCY: Primary | ICD-10-CM

## 2025-03-21 DIAGNOSIS — J44.9 CHRONIC OBSTRUCTIVE PULMONARY DISEASE, UNSPECIFIED COPD TYPE (MULTI): ICD-10-CM

## 2025-03-21 PROCEDURE — 96372 THER/PROPH/DIAG INJ SC/IM: CPT | Performed by: INTERNAL MEDICINE

## 2025-03-21 RX ORDER — CYANOCOBALAMIN 1000 UG/ML
1000 INJECTION, SOLUTION INTRAMUSCULAR; SUBCUTANEOUS ONCE
Status: COMPLETED | OUTPATIENT
Start: 2025-03-21 | End: 2025-03-21

## 2025-03-21 RX ADMIN — CYANOCOBALAMIN 1000 MCG: 1000 INJECTION, SOLUTION INTRAMUSCULAR; SUBCUTANEOUS at 10:45

## 2025-03-24 RX ORDER — FLUTICASONE FUROATE, UMECLIDINIUM BROMIDE AND VILANTEROL TRIFENATATE 100; 62.5; 25 UG/1; UG/1; UG/1
1 POWDER RESPIRATORY (INHALATION) DAILY
Qty: 60 EACH | Refills: 2 | Status: SHIPPED | OUTPATIENT
Start: 2025-03-24

## 2025-04-05 DIAGNOSIS — J44.9 CHRONIC OBSTRUCTIVE PULMONARY DISEASE, UNSPECIFIED COPD TYPE (MULTI): ICD-10-CM

## 2025-04-07 RX ORDER — FLUTICASONE FUROATE, UMECLIDINIUM BROMIDE AND VILANTEROL TRIFENATATE 100; 62.5; 25 UG/1; UG/1; UG/1
POWDER RESPIRATORY (INHALATION)
Qty: 60 EACH | Refills: 1 | Status: SHIPPED | OUTPATIENT
Start: 2025-04-07 | End: 2025-05-07

## 2025-04-11 ENCOUNTER — APPOINTMENT (OUTPATIENT)
Dept: PRIMARY CARE | Facility: CLINIC | Age: 75
End: 2025-04-11
Payer: MEDICARE

## 2025-04-29 ENCOUNTER — CLINICAL SUPPORT (OUTPATIENT)
Dept: PRIMARY CARE | Facility: CLINIC | Age: 75
End: 2025-04-29
Payer: MEDICARE

## 2025-04-29 DIAGNOSIS — E53.8 VITAMIN B 12 DEFICIENCY: Primary | ICD-10-CM

## 2025-04-29 PROCEDURE — 96372 THER/PROPH/DIAG INJ SC/IM: CPT | Performed by: INTERNAL MEDICINE

## 2025-04-29 RX ORDER — CYANOCOBALAMIN 1000 UG/ML
1000 INJECTION, SOLUTION INTRAMUSCULAR; SUBCUTANEOUS ONCE
Status: COMPLETED | OUTPATIENT
Start: 2025-04-29 | End: 2025-04-29

## 2025-04-29 RX ADMIN — CYANOCOBALAMIN 1000 MCG: 1000 INJECTION, SOLUTION INTRAMUSCULAR; SUBCUTANEOUS at 09:08

## 2025-05-01 DIAGNOSIS — K21.9 GASTROESOPHAGEAL REFLUX DISEASE WITHOUT ESOPHAGITIS: ICD-10-CM

## 2025-05-02 RX ORDER — CALC/MAG/B COMPLEX/D3/HERB 61
15 TABLET ORAL DAILY
Qty: 90 CAPSULE | Refills: 1 | Status: SHIPPED | OUTPATIENT
Start: 2025-05-02

## 2025-05-19 DIAGNOSIS — R73.9 HYPERGLYCEMIA: ICD-10-CM

## 2025-05-19 RX ORDER — METFORMIN HYDROCHLORIDE 500 MG/1
TABLET, EXTENDED RELEASE ORAL
Qty: 90 TABLET | Refills: 0 | Status: SHIPPED | OUTPATIENT
Start: 2025-05-19

## 2025-05-26 DIAGNOSIS — E78.00 HYPERCHOLESTEROLEMIA: ICD-10-CM

## 2025-05-26 DIAGNOSIS — F32.A DEPRESSION, UNSPECIFIED DEPRESSION TYPE: ICD-10-CM

## 2025-05-26 DIAGNOSIS — J81.0 ACUTE PULMONARY EDEMA: ICD-10-CM

## 2025-05-27 RX ORDER — FUROSEMIDE 20 MG/1
20 TABLET ORAL DAILY
Qty: 90 TABLET | Refills: 3 | Status: SHIPPED | OUTPATIENT
Start: 2025-05-27

## 2025-05-27 RX ORDER — ATORVASTATIN CALCIUM 80 MG/1
80 TABLET, FILM COATED ORAL DAILY
Qty: 90 TABLET | Refills: 3 | Status: SHIPPED | OUTPATIENT
Start: 2025-05-27

## 2025-05-27 RX ORDER — SERTRALINE HYDROCHLORIDE 100 MG/1
100 TABLET, FILM COATED ORAL DAILY
Qty: 90 TABLET | Refills: 3 | Status: SHIPPED | OUTPATIENT
Start: 2025-05-27

## 2025-07-14 DIAGNOSIS — Z95.1 HISTORY OF CORONARY ARTERY BYPASS GRAFT X 1: ICD-10-CM

## 2025-07-14 RX ORDER — METOPROLOL SUCCINATE 25 MG/1
TABLET, EXTENDED RELEASE ORAL
Qty: 45 TABLET | Refills: 1 | Status: SHIPPED | OUTPATIENT
Start: 2025-07-14

## 2025-08-01 DIAGNOSIS — R73.9 HYPERGLYCEMIA: ICD-10-CM

## 2025-08-01 DIAGNOSIS — J44.9 CHRONIC OBSTRUCTIVE PULMONARY DISEASE, UNSPECIFIED COPD TYPE (MULTI): ICD-10-CM

## 2025-08-01 RX ORDER — METFORMIN HYDROCHLORIDE 500 MG/1
TABLET, EXTENDED RELEASE ORAL
Qty: 90 TABLET | Refills: 0 | Status: SHIPPED | OUTPATIENT
Start: 2025-08-01

## 2025-08-01 RX ORDER — FLUTICASONE FUROATE, UMECLIDINIUM BROMIDE AND VILANTEROL TRIFENATATE 100; 62.5; 25 UG/1; UG/1; UG/1
1 POWDER RESPIRATORY (INHALATION) DAILY
Qty: 3 EACH | Refills: 1 | Status: SHIPPED | OUTPATIENT
Start: 2025-08-01 | End: 2025-08-31